# Patient Record
Sex: FEMALE | Race: ASIAN | Employment: STUDENT | URBAN - METROPOLITAN AREA
[De-identification: names, ages, dates, MRNs, and addresses within clinical notes are randomized per-mention and may not be internally consistent; named-entity substitution may affect disease eponyms.]

---
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
## 2019-07-16 ENCOUNTER — OFFICE VISIT (OUTPATIENT)
Dept: DERMATOLOGY | Facility: CLINIC | Age: 19
End: 2019-07-16
Payer: COMMERCIAL

## 2019-07-16 DIAGNOSIS — Z79.899 ON ISOTRETINOIN THERAPY: ICD-10-CM

## 2019-07-16 DIAGNOSIS — L70.0 ACNE VULGARIS: Primary | ICD-10-CM

## 2019-07-16 ASSESSMENT — PAIN SCALES - GENERAL: PAINLEVEL: NO PAIN (0)

## 2019-07-16 NOTE — LETTER
7/16/2019       RE: Jessy Portillo  [address] [address]     Dear Colleague,    Thank you for referring your patient, Jessy Portillo, to the [facility] DERMATOLOGY at [facility]. Please see a copy of my visit note below.    [facility] Dermatology Note      Dermatology Problem List:  1. Acne Vulgaris  -Current Tx: Will plan to start isotretinoin 40 mg in September   -Previous Tx: adapalene 0.3% gel, sulfamethoxazole-trimethoprim 180 mg BID, clindamycin,     Encounter Date: Jul 16, 2019    CC:  Chief Complaint   Patient presents with     Acne     Jessy is here today to be seen for acne.          History of Present Illness:  Ms. Jessy Portillo is a 19 year old female who is new to the clinic and presents for acne. At today's visit, the patient notes she has been dealing with intermittent acne for about six years. She states she had previously dealt with body acne but only deals with facial acne now. She says she is not currently using any prescription medication to treat her acne. Patient does not note a correlation between her menstrual cycle and acne. The patient denies painful, itching, tingling or bleeding lesions unless otherwise noted. Contraception: IUD, male latex condoms. No hx depression/anxiey. No hx IBD. Patient is leaving town during the month of August and will not be back until school starts in September.      Past Medical History:   Patient Active Problem List   Diagnosis     Acute appendicitis     History reviewed. No pertinent past medical history.  Past Surgical History:   Procedure Laterality Date     LAPAROSCOPIC APPENDECTOMY N/A 12/9/2016    Procedure: LAPAROSCOPIC APPENDECTOMY;  Surgeon: Perry Mckeon MD;  Location:  OR       Social History:   reports that she has never smoked. She has never used smokeless tobacco.    Family History:  History reviewed. No pertinent family  history.    Medications:  Current Outpatient Medications   Medication Sig Dispense Refill     dexamethasone (DECADRON) 4 MG tablet Take 1 tablet (4 mg) by mouth 2 times daily (with meals) for 3 days 6 tablet 0       No Known Allergies    Review of Systems:  -Constitutional: The patient denies fatigue, fevers, chills, unintended weight loss, and night sweats.  -HEENT: Patient denies nonhealing oral sores.  -Skin: As above in HPI. No additional skin concerns.  -Neuro: no HA or vision changes  -GI: No nausea, blood in stool, diarrhea, hx of IBD  -Psych: no depression/anxiety, mood changes, or sleep problems   -Musculoskeletal: no joint or muscle pain or swelling   -Heme/Lymph: no concerning bumps, no bleeding problems    Physical exam:  Vitals: There were no vitals taken for this visit.  GEN: This is a well developed, well-nourished female in no acute distress, in a pleasant mood.    SKIN: Acne exam, which includes the face, neck, upper and upper central back was performed.  -There are superifical acneiform papules with intermixed open and closed comedones on the face.   -No other lesions of concern on areas examined.       Impression/Plan:  1. Acne vulgaris    Discussed treatment options including topical ointments, oral antibiotics, and specifically Accutane and Spironolactone   Discussion of the risks and side effects of isotretinoin including but not limited to mucocutaneous dryness, arthralgias, myalgias, depression, suicidal ideation, headache, blurred vision, increase in liver function test and increase in lipids. The iPledge program brochure was provided and the contents discussed with the patient. The patient was counseled that they cannot give blood while on isotretinoin. Advised against tattoos and waxing. No personal or family history of inflammatory bowel disease or hypertriglyceridemia known to patient. Reviewed need to avoid alcohol on medication. The iPledge program consent was obtained. Patient  counseled that if they wear contacts, the eyes may become too dry to tolerate. Recommend follow up with eye doctor if this occurs.    Discussed need for sun protection, at least SPF 30+.  Urine pregnancy test obtained today will be deferred to 8/6/19 due to patient leaving town and unable to start within the 30 day window. We will start isotretinoin 40 mg daily pending negative pregnancy test on 08/06/19 due to patient leaving the country in mid August and returning in early September. One month supply with no refills will be provided. Goal dose is 150-220mg/kg for this (Obtain weight at next visit) patient.    Labs including CBC, BUN/Cr, fasting lipids and AST/ALT will be obtained next visit.   Contraception: IUD & male condoms  Total cumulative dose 0mg/kg.   Patient's iPledge # is [id number]  Will modify skin care routine at next visit, for now may continue with current regimen.      CC Dr. Bautista on close of this encounter.  Follow-up in 6-7 weeks, earlier for new or changing lesions.       Staff Involved:  Staff/Scribe    Scribe Disclosure:  I, Cornel Riddle, am serving as a scribe to document services personally performed by Italia Shipley PA-C, based on data collection and the provider's statements to me.     Provider Disclosure:   The documentation recorded by the scribe accurately reflects the services I personally performed and the decisions made by me.    All risks, benefits and alternatives were discussed with patient.  Patient is in agreement and understands the assessment and plan.  All questions were answered.    Italia Shipley PA-C  [facility]: Phone: [phone], Fax: [phone]

## 2019-07-16 NOTE — NURSING NOTE
Dermatology Rooming Note    Jessy Portillo's goals for this visit include:   Chief Complaint   Patient presents with     Acne     Jessy is here today to be seen for acne.      MAXIMINO Temple

## 2019-07-16 NOTE — PROGRESS NOTES
[facility] Dermatology Note      Dermatology Problem List:  1. Acne Vulgaris  -Current Tx: Will plan to start isotretinoin 40 mg in September   -Previous Tx: adapalene 0.3% gel, sulfamethoxazole-trimethoprim 180 mg BID, clindamycin,     Encounter Date: Jul 16, 2019    CC:  Chief Complaint   Patient presents with     Acne     Jessy is here today to be seen for acne.          History of Present Illness:  Ms. Jessy Portillo is a 19 year old female who is new to the clinic and presents for acne. At today's visit, the patient notes she has been dealing with intermittent acne for about six years. She states she had previously dealt with body acne but only deals with facial acne now. She says she is not currently using any prescription medication to treat her acne. Patient does not note a correlation between her menstrual cycle and acne. The patient denies painful, itching, tingling or bleeding lesions unless otherwise noted. Contraception: IUD, male latex condoms. No hx depression/anxiey. No hx IBD. Patient is leaving town during the month of August and will not be back until school starts in September.      Past Medical History:   Patient Active Problem List   Diagnosis     Acute appendicitis     History reviewed. No pertinent past medical history.  Past Surgical History:   Procedure Laterality Date     LAPAROSCOPIC APPENDECTOMY N/A 12/9/2016    Procedure: LAPAROSCOPIC APPENDECTOMY;  Surgeon: Perry Mckeon MD;  Location:  OR       Social History:   reports that she has never smoked. She has never used smokeless tobacco.    Family History:  History reviewed. No pertinent family history.    Medications:  Current Outpatient Medications   Medication Sig Dispense Refill     dexamethasone (DECADRON) 4 MG tablet Take 1 tablet (4 mg) by mouth 2 times daily (with meals) for 3 days 6 tablet 0       No Known Allergies    Review of Systems:  -Constitutional: The patient denies fatigue, fevers,  chills, unintended weight loss, and night sweats.  -HEENT: Patient denies nonhealing oral sores.  -Skin: As above in HPI. No additional skin concerns.  -Neuro: no HA or vision changes  -GI: No nausea, blood in stool, diarrhea, hx of IBD  -Psych: no depression/anxiety, mood changes, or sleep problems   -Musculoskeletal: no joint or muscle pain or swelling   -Heme/Lymph: no concerning bumps, no bleeding problems    Physical exam:  Vitals: There were no vitals taken for this visit.  GEN: This is a well developed, well-nourished female in no acute distress, in a pleasant mood.    SKIN: Acne exam, which includes the face, neck, upper and upper central back was performed.  -There are superifical acneiform papules with intermixed open and closed comedones on the face.   -No other lesions of concern on areas examined.       Impression/Plan:  1. Acne vulgaris    Discussed treatment options including topical ointments, oral antibiotics, and specifically Accutane and Spironolactone   Discussion of the risks and side effects of isotretinoin including but not limited to mucocutaneous dryness, arthralgias, myalgias, depression, suicidal ideation, headache, blurred vision, increase in liver function test and increase in lipids. The iPledge program brochure was provided and the contents discussed with the patient. The patient was counseled that they cannot give blood while on isotretinoin. Advised against tattoos and waxing. No personal or family history of inflammatory bowel disease or hypertriglyceridemia known to patient. Reviewed need to avoid alcohol on medication. The iPledge program consent was obtained. Patient counseled that if they wear contacts, the eyes may become too dry to tolerate. Recommend follow up with eye doctor if this occurs.    Discussed need for sun protection, at least SPF 30+.  Urine pregnancy test obtained today will be deferred to 8/6/19 due to patient leaving town and unable to start within the 30 day  window. We will start isotretinoin 40 mg daily pending negative pregnancy test on 08/06/19 due to patient leaving the country in mid August and returning in early September. One month supply with no refills will be provided. Goal dose is 150-220mg/kg for this (Obtain weight at next visit) patient.    Labs including CBC, BUN/Cr, fasting lipids and AST/ALT will be obtained next visit.   Contraception: IUD & male condoms  Total cumulative dose 0mg/kg.   Patient's iPledge # is [id number]  Will modify skin care routine at next visit, for now may continue with current regimen.      CC Dr. Bautista on close of this encounter.  Follow-up in 6-7 weeks, earlier for new or changing lesions.       Staff Involved:  Staff/Scribe    Scribe Disclosure:  I, Cornel Riddle, am serving as a scribe to document services personally performed by Italia Shipley PA-C, based on data collection and the provider's statements to me.     Provider Disclosure:   The documentation recorded by the scribe accurately reflects the services I personally performed and the decisions made by me.    All risks, benefits and alternatives were discussed with patient.  Patient is in agreement and understands the assessment and plan.  All questions were answered.    Italia Shipley PA-C  [facility]: Phone: [phone], Fax: [phone]

## 2019-08-05 ENCOUNTER — APPOINTMENT (OUTPATIENT)
Dept: LAB | Facility: CLINIC | Age: 19
End: 2019-08-05
Payer: COMMERCIAL

## 2019-08-09 ENCOUNTER — TELEPHONE (OUTPATIENT)
Dept: DERMATOLOGY | Facility: CLINIC | Age: 19
End: 2019-08-09

## 2019-08-09 DIAGNOSIS — L70.0 ACNE VULGARIS: ICD-10-CM

## 2019-08-09 DIAGNOSIS — Z79.899 ON ISOTRETINOIN THERAPY: Primary | ICD-10-CM

## 2019-08-09 NOTE — TELEPHONE ENCOUNTER
[facility] Call Center    Phone Message    May a detailed message be left on voicemail: yes    Reason for Call: Other: Pt calling in and stated that she did leave a sample for her pregnancy test, but they told her they would stillneed an order. Pt is requesting a call back to discuss this. Please follow up when available. Thank you     Action Taken: Message routed to:  [facility] ([facility] Derm

## 2019-08-09 NOTE — TELEPHONE ENCOUNTER
Called pt and LVM. Wanted to let her know that she had not got her pregnancy test done on 8/6/19. She needs to get that done soon, and we need to reschedule her 9/6/19 appointment. Clinic number provided.   MAXIMINO Temple

## 2019-08-09 NOTE — TELEPHONE ENCOUNTER
Called pt back and said that she came in on 8/6/19 and had blood drawn and was told that there was no order in, but said that her blood would be kept for a week. When I called lab they said that she was actually in on the 5th and they said that the blood had already been thrown away. I called pt back and let her know that she needs to come back in sometime that is convenient for her. Pt is coming in 8/12/19 for a pregnancy test.     MAXIMINO Temple

## 2019-08-12 DIAGNOSIS — Z79.899 ON ISOTRETINOIN THERAPY: ICD-10-CM

## 2019-08-12 LAB — HCG UR QL: NEGATIVE

## 2019-09-10 ENCOUNTER — OFFICE VISIT (OUTPATIENT)
Dept: DERMATOLOGY | Facility: CLINIC | Age: 19
End: 2019-09-10
Payer: COMMERCIAL

## 2019-09-10 DIAGNOSIS — Z79.899 ON ISOTRETINOIN THERAPY: ICD-10-CM

## 2019-09-10 DIAGNOSIS — L70.0 ACNE VULGARIS: Primary | ICD-10-CM

## 2019-09-10 LAB
ALBUMIN SERPL-MCNC: 4.2 G/DL (ref 3.4–5)
ALP SERPL-CCNC: 58 U/L (ref 40–150)
ALT SERPL W P-5'-P-CCNC: 20 U/L (ref 0–50)
ANION GAP SERPL CALCULATED.3IONS-SCNC: 6 MMOL/L (ref 3–14)
AST SERPL W P-5'-P-CCNC: 18 U/L (ref 0–45)
BASOPHILS # BLD AUTO: 0 10E9/L (ref 0–0.2)
BASOPHILS NFR BLD AUTO: 0.7 %
BILIRUB SERPL-MCNC: 0.7 MG/DL (ref 0.2–1.3)
BUN SERPL-MCNC: 7 MG/DL (ref 7–30)
CALCIUM SERPL-MCNC: 9.2 MG/DL (ref 8.5–10.1)
CHLORIDE SERPL-SCNC: 102 MMOL/L (ref 94–109)
CHOLEST SERPL-MCNC: 217 MG/DL
CO2 SERPL-SCNC: 27 MMOL/L (ref 20–32)
CREAT SERPL-MCNC: 0.76 MG/DL (ref 0.52–1.04)
DIFFERENTIAL METHOD BLD: NORMAL
EOSINOPHIL # BLD AUTO: 0.1 10E9/L (ref 0–0.7)
EOSINOPHIL NFR BLD AUTO: 1.9 %
ERYTHROCYTE [DISTWIDTH] IN BLOOD BY AUTOMATED COUNT: 14.1 % (ref 10–15)
GFR SERPL CREATININE-BSD FRML MDRD: >90 ML/MIN/{1.73_M2}
GLUCOSE SERPL-MCNC: 91 MG/DL (ref 70–99)
HCT VFR BLD AUTO: 42.1 % (ref 35–47)
HDLC SERPL-MCNC: 86 MG/DL
HGB BLD-MCNC: 13.3 G/DL (ref 11.7–15.7)
IMM GRANULOCYTES # BLD: 0 10E9/L (ref 0–0.4)
IMM GRANULOCYTES NFR BLD: 0 %
LDLC SERPL CALC-MCNC: 120 MG/DL
LYMPHOCYTES # BLD AUTO: 1.7 10E9/L (ref 0.8–5.3)
LYMPHOCYTES NFR BLD AUTO: 40.4 %
MCH RBC QN AUTO: 27.3 PG (ref 26.5–33)
MCHC RBC AUTO-ENTMCNC: 31.6 G/DL (ref 31.5–36.5)
MCV RBC AUTO: 86 FL (ref 78–100)
MONOCYTES # BLD AUTO: 0.5 10E9/L (ref 0–1.3)
MONOCYTES NFR BLD AUTO: 11.5 %
NEUTROPHILS # BLD AUTO: 1.9 10E9/L (ref 1.6–8.3)
NEUTROPHILS NFR BLD AUTO: 45.5 %
NONHDLC SERPL-MCNC: 131 MG/DL
NRBC # BLD AUTO: 0 10*3/UL
NRBC BLD AUTO-RTO: 0 /100
PLATELET # BLD AUTO: 267 10E9/L (ref 150–450)
POTASSIUM SERPL-SCNC: 3.5 MMOL/L (ref 3.4–5.3)
PROT SERPL-MCNC: 8.4 G/DL (ref 6.8–8.8)
RBC # BLD AUTO: 4.87 10E12/L (ref 3.8–5.2)
SODIUM SERPL-SCNC: 135 MMOL/L (ref 133–144)
TRIGL SERPL-MCNC: 53 MG/DL
WBC # BLD AUTO: 4.2 10E9/L (ref 4–11)

## 2019-09-10 RX ORDER — ISOTRETINOIN 40 MG/1
40 CAPSULE ORAL DAILY
Qty: 30 CAPSULE | Refills: 0 | Status: SHIPPED | OUTPATIENT
Start: 2019-09-10 | End: 2019-10-17

## 2019-09-10 ASSESSMENT — PAIN SCALES - GENERAL: PAINLEVEL: NO PAIN (0)

## 2019-09-10 NOTE — NURSING NOTE
Dermatology Rooming Note    Jessy Portillo's goals for this visit include:   Chief Complaint   Patient presents with     Accutane     Jessy is here today to start accutane.      MAXIMINO Temple

## 2019-09-10 NOTE — PROGRESS NOTES
[facility] Dermatology Note      Dermatology Problem List:  1. Acne Vulgaris  -Current Tx: Start isotretinoin 40mg every day - started 9/10/19  -Previous Tx: adapalene 0.3% gel, sulfamethoxazole-trimethoprim 180 mg BID, clindamycin,     Encounter Date: Sep 10, 2019    CC:  Chief Complaint   Patient presents with     Accutane     Jessy is here today to start accutane.          History of Present Illness:  Ms. Jessy Portillo is a 19 year old female who presents as a follow-up for acne. The patient was last seen on 07/16/19 when isotretinoin 40mg every day was planned to be started for acne in September as the patient left the country in mid August and will be returning in early September.     At today's visit, the patient does not note any specific concerns with starting Accutane and is excited to start the medication. Contraception includes IUD and male latex condoms. Is also currently abstinent. She is not taking any other medications at this time. Her acne has been recalcitrant to oral abx and topical adapalene and clindamycin.       Past Medical History:   Patient Active Problem List   Diagnosis     Acute appendicitis     No past medical history on file.  Past Surgical History:   Procedure Laterality Date     LAPAROSCOPIC APPENDECTOMY N/A 12/9/2016    Procedure: LAPAROSCOPIC APPENDECTOMY;  Surgeon: Perry Mckeon MD;  Location:  OR       Social History:   reports that she has never smoked. She has never used smokeless tobacco.    Family History:  No family history on file.    Medications:  Current Outpatient Medications   Medication Sig Dispense Refill     dexamethasone (DECADRON) 4 MG tablet Take 1 tablet (4 mg) by mouth 2 times daily (with meals) for 3 days 6 tablet 0       No Known Allergies    Review of Systems:  -Neuro: no HA or vision changes  -GI: No nausea, blood in stool, diarrhea, hx of IBD  -Psych: no depression/anxiety, mood changes, or sleep problems    -Musculoskeletal: no joint or muscle pain or swelling   -Heme/Lymph: no concerning bumps, no bleeding problems  -Constitutional: The patient denies fatigue, fevers, chills, unintended weight loss, and night sweats.  -HEENT: Patient denies nonhealing oral sores.  -Skin: As above in HPI. No additional skin concerns.    Physical exam:  Vitals: There were no vitals taken for this visit.  GEN: This is a well developed, well-nourished female in no acute distress, in a pleasant mood.    SKIN: Acne exam, which includes the face, neck, upper central chest, and upper central back was performed.  -There are superifical acneiform papules with intermixed open and closed comedones on the face.   -No other lesions of concern on areas examined.       Impression/Plan:  1. Acne vulgaris  Will start isotretinoin 40mg this month pending negative pregnancy test. Goal dose is 150-220mg/kg for this 66.82kg patient.   Method of contraception includes: IUD and male condoms. Patient is also currently abstinent.  Discussion of the risks and side effects of isotretinoin including but not limited to mucocutaneous dryness, arthralgias, myalgias, depression, suicidal ideation, headache, blurred vision, increase in liver function test and increase in lipids. The iPledge program brochure was provided and the contents discussed with the patient. The patient was counseled that they cannot give blood while on isotretinoin. Advised against tattoos and waxing. No personal or family history of inflammatory bowel disease or hypertriglyceridemia known to patient. Reviewed need to avoid alcohol on medication. The iPledge program consent was obtained. Patient counseled that if they wear contacts, the eyes may become too dry to tolerate. Recommend follow up with eye doctor if this occurs.    Discussed need for sun protection, at least SPF 30+.  Urine pregnancy test obtained on 9/12/2019.  Baseline labs including qualitative hCG, CBC, BUN/Cr, lipids and  AST/ALT will be obtained.   Patient's iPledge # is [id number].   The patient will stop all other acne medications this month.  Total dose: 0mg/kg    CC Dr. Stafford on close of this encounter.  Follow-up in 1 month, earlier for new or changing lesions.       Staff Involved:  Staff/Scribe    Scribe Disclosure:  I, Cornel Riddle, am serving as a scribe to document services personally performed by Italia Shipley PA-C, based on data collection and the provider's statements to me.     Provider Disclosure:   The documentation recorded by the scribe accurately reflects the services I personally performed and the decisions made by me.    All risks, benefits and alternatives were discussed with patient.  Patient is in agreement and understands the assessment and plan.  All questions were answered.    Italia Shipley PA-C  [facility]: Phone: [phone], Fax: [phone]

## 2019-09-10 NOTE — LETTER
9/10/2019       RE: Jessy Portillo  [address] [address]     Dear Colleague,    Thank you for referring your patient, Jessy Portillo, to the [facility] DERMATOLOGY at [facility]. Please see a copy of my visit note below.    [facility] Dermatology Note      Dermatology Problem List:  1. Acne Vulgaris  -Current Tx: Start isotretinoin 40mg every day - started 9/10/19  -Previous Tx: adapalene 0.3% gel, sulfamethoxazole-trimethoprim 180 mg BID, clindamycin,     Encounter Date: Sep 10, 2019    CC:  Chief Complaint   Patient presents with     Accutane     Jessy is here today to start accutane.          History of Present Illness:  Ms. Jessy Portillo is a 19 year old female who presents as a follow-up for acne. The patient was last seen on 07/16/19 when isotretinoin 40mg every day was planned to be started for acne in September as the patient left the country in mid August and will be returning in early September.     At today's visit, the patient does not note any specific concerns with starting Accutane and is excited to start the medication. Contraception includes IUD and male latex condoms. Is also currently abstinent. She is not taking any other medications at this time. Her acne has been recalcitrant to oral abx and topical adapalene and clindamycin.       Past Medical History:   Patient Active Problem List   Diagnosis     Acute appendicitis     No past medical history on file.  Past Surgical History:   Procedure Laterality Date     LAPAROSCOPIC APPENDECTOMY N/A 12/9/2016    Procedure: LAPAROSCOPIC APPENDECTOMY;  Surgeon: Perry Mckeon MD;  Location:  OR       Social History:   reports that she has never smoked. She has never used smokeless tobacco.    Family History:  No family history on file.    Medications:  Current Outpatient Medications   Medication Sig Dispense Refill     dexamethasone (DECADRON) 4 MG  tablet Take 1 tablet (4 mg) by mouth 2 times daily (with meals) for 3 days 6 tablet 0       No Known Allergies    Review of Systems:  -Neuro: no HA or vision changes  -GI: No nausea, blood in stool, diarrhea, hx of IBD  -Psych: no depression/anxiety, mood changes, or sleep problems   -Musculoskeletal: no joint or muscle pain or swelling   -Heme/Lymph: no concerning bumps, no bleeding problems  -Constitutional: The patient denies fatigue, fevers, chills, unintended weight loss, and night sweats.  -HEENT: Patient denies nonhealing oral sores.  -Skin: As above in HPI. No additional skin concerns.    Physical exam:  Vitals: There were no vitals taken for this visit.  GEN: This is a well developed, well-nourished female in no acute distress, in a pleasant mood.    SKIN: Acne exam, which includes the face, neck, upper central chest, and upper central back was performed.  -There are superifical acneiform papules with intermixed open and closed comedones on the face.   -No other lesions of concern on areas examined.       Impression/Plan:  1. Acne vulgaris  Will start isotretinoin 40mg this month pending negative pregnancy test. Goal dose is 150-220mg/kg for this 66.82kg patient.   Method of contraception includes: IUD and male condoms. Patient is also currently abstinent.  Discussion of the risks and side effects of isotretinoin including but not limited to mucocutaneous dryness, arthralgias, myalgias, depression, suicidal ideation, headache, blurred vision, increase in liver function test and increase in lipids. The iPledge program brochure was provided and the contents discussed with the patient. The patient was counseled that they cannot give blood while on isotretinoin. Advised against tattoos and waxing. No personal or family history of inflammatory bowel disease or hypertriglyceridemia known to patient. Reviewed need to avoid alcohol on medication. The iPledge program consent was obtained. Patient counseled that if  they wear contacts, the eyes may become too dry to tolerate. Recommend follow up with eye doctor if this occurs.    Discussed need for sun protection, at least SPF 30+.  Urine pregnancy test obtained on 9/12/2019.  Baseline labs including qualitative hCG, CBC, BUN/Cr, lipids and AST/ALT will be obtained.   Patient's iPledge # is [id number].   The patient will stop all other acne medications this month.  Total dose: 0mg/kg    CC Dr. Stafford on close of this encounter.  Follow-up in 1 month, earlier for new or changing lesions.       Staff Involved:  Staff/Scribe    Scribe Disclosure:  I, Cornel Riddle, am serving as a scribe to document services personally performed by Italia Shipley PA-C, based on data collection and the provider's statements to me.     Provider Disclosure:   The documentation recorded by the scribe accurately reflects the services I personally performed and the decisions made by me.    All risks, benefits and alternatives were discussed with patient.  Patient is in agreement and understands the assessment and plan.  All questions were answered.    Italia Shipley PA-C  [facility]: Phone: [phone], Fax: [phone]

## 2019-09-12 ENCOUNTER — TELEPHONE (OUTPATIENT)
Dept: DERMATOLOGY | Facility: CLINIC | Age: 19
End: 2019-09-12

## 2019-09-12 DIAGNOSIS — Z79.899 ON ISOTRETINOIN THERAPY: ICD-10-CM

## 2019-09-12 LAB — HCG UR QL: NEGATIVE

## 2019-09-12 NOTE — TELEPHONE ENCOUNTER
Patient called stating she is unable to answer the questions in Ipledge. I let her know that once we have the results of pregnancy test we will confirm her in ipledge. Once that is completed she can answer the questions.    KI ChenA

## 2019-09-27 ENCOUNTER — TELEPHONE (OUTPATIENT)
Dept: DERMATOLOGY | Facility: CLINIC | Age: 19
End: 2019-09-27

## 2019-10-03 ENCOUNTER — TELEPHONE (OUTPATIENT)
Dept: DERMATOLOGY | Facility: CLINIC | Age: 19
End: 2019-10-03

## 2019-10-03 NOTE — TELEPHONE ENCOUNTER
"[facility] Call Center    Phone Message    May a detailed message be left on voicemail: yes    Reason for Call: Other: .  MUST be scheduled with the same provider they have seen in the past, and between 30-36 days of their last visit. Patient needs labs scheduled prior to their provider visit. If no appointment available between 30-36 days, send a telephone encounter to the main clinic pool.     Unsure what \"main\" clinic pool is - this is scheduling related - so will send to coordinator pool    Checked Quinten schedule and out past time limit    Action Taken: Message routed to:  [facility] derm  "

## 2019-10-17 ENCOUNTER — OFFICE VISIT (OUTPATIENT)
Dept: DERMATOLOGY | Facility: CLINIC | Age: 19
End: 2019-10-17
Payer: COMMERCIAL

## 2019-10-17 DIAGNOSIS — Z79.899 ON ISOTRETINOIN THERAPY: ICD-10-CM

## 2019-10-17 DIAGNOSIS — Z51.81 MEDICATION MONITORING ENCOUNTER: ICD-10-CM

## 2019-10-17 DIAGNOSIS — L70.0 ACNE VULGARIS: Primary | ICD-10-CM

## 2019-10-17 LAB — HCG UR QL: NEGATIVE

## 2019-10-17 RX ORDER — ISOTRETINOIN 25 MG/1
50 CAPSULE ORAL DAILY
Qty: 30 CAPSULE | Refills: 0 | Status: SHIPPED | OUTPATIENT
Start: 2019-10-17 | End: 2019-12-20 | Stop reason: DRUGHIGH

## 2019-10-17 ASSESSMENT — PAIN SCALES - GENERAL: PAINLEVEL: NO PAIN (0)

## 2019-10-17 NOTE — LETTER
"10/17/2019       RE: Jessy Portillo  [address]  [facility] [address]     Dear Colleague,    Thank you for referring your patient, Jessy Portillo, to the [facility] DERMATOLOGY at [facility]. Please see a copy of my visit note below.    Chief Complaint   Patient presents with     Accutane     Accutane follow up     Ayala Lange CMA      [facility] Dermatology Note    Dermatology Clinic  [facility]  Clinics and Surgery Center  [address] [place] [address]    Dermatology Problem List:  1. Acne Vulgaris  -Current Tx: Isotretinoin started 9/10/19 (40-50 if able to get 25 mg capsules otherwise 60)  -Previous Tx: adapalene 0.3% gel, sulfamethoxazole-trimethoprim 180 mg BID, clindamycin    Encounter Date: Oct 17, 2019    CC:  Chief Complaint   Patient presents with     Accutane     Accutane follow up       History of Present Illness:  Ms. Jessy Portillo is a 19 year old female who presents as a follow-up for acne. The patient was last seen by Italia Shipley PA-C on 9/10/19 for acne vulgaris. At the time she was started on Accutane 40 mg daily. Today the patient reports that her first month has went well. Her skin is dry but is tolerable. She has had a mild headache which she treats with Advil. Reports that she did go to the eye doctor because her eyesight is getting worse and she needed her prescription updated, but she is unsure if it is due to the Accutane. Has had some cramping of the lower extremities. No stomach upset, no depression but feels \"burnt out\". She wonders if some of her recent symptoms are related to her recent IUD placement.     She feels that it is too early to tell if the Accutane is helping. She is otherwise in her usual state of health, no additional skin concerns. She is going out of town this weekend and has a midterm next week.    Past Medical History:   Patient Active Problem List "   Diagnosis     Acute appendicitis     History reviewed. No pertinent past medical history.  Past Surgical History:   Procedure Laterality Date     LAPAROSCOPIC APPENDECTOMY N/A 12/9/2016    Procedure: LAPAROSCOPIC APPENDECTOMY;  Surgeon: Perry Mckeon MD;  Location:  OR       Social History:  Patient reports that she has never smoked. She has never used smokeless tobacco.   She is a student.    Family History:  History reviewed. No pertinent family history.   Her sister also had acne per chart review.    Medications:  Current Outpatient Medications   Medication Sig Dispense Refill     ISOtretinoin (ACCUTANE) 40 MG capsule Take 1 capsule (40 mg) by mouth daily 30 capsule 0       No Known Allergies    Review of Systems:  -As per HPI  -Constitutional: Otherwise feeling well today, in usual state of health.  -Skin: As above in HPI. No additional skin concerns.  - Accutane: +muscle cramping, mild headache, change in glasses prescription, apathy. No depression or suicidal ideation.    Physical exam:  GEN: This is a well developed, well-nourished female in no acute distress, in a pleasant mood.    SKIN: Focused examination of the face and neck was performed.  -Kearns skin type: II  -Many acneiform papules predominantly on the cheeks, less on the chin and forehead, and rare pustules  -No other lesions of concern on areas examined.     Impression/Plan:  1. Acne vulgaris, s/p month #1 of isotretinoin. Overall stable with some mild symptoms but patient not sure if related to isotretinoin or recent IUD. Discussed increasing dose versus maintaining and she would like to increase dose. If able to get 25 mg tabs, will increase to 50 mg daily, otherwise will increase to 60 mg daily. She has a negative pregnancy test today but does not have time to get blood work today. She will return for blood work next Wednesday after her midterm.    Increase isotretinoin to 50 mg daily if able to get 25 mg tabs, otherwise increase to  60 mg daily. Goal dose is 120-150mg/kg (8,016-10,020 mg) for this 66.82kg patient.     Method of contraception includes: IUD and male condoms. Patient is also currently abstinent.     Discussed need for sun protection, at least SPF 30+.    Pregnancy test negative 10/17/19.    Will return on Wednesday for repeat CBC, hepatic panel, and lipids.    Patient's iPledge # is [id number].     Recommended Vanicream, Cetaphil gentle facial cleanser, Vaseline for lips or nose, or gentle moisturizer, etc, to help with skin dryness.     Total dose: 1200 mg    Follow-up in 1 month, sooner if concerns.  Return for labs on Wednesday.    Staff Involved:    Scribe Disclosure  I, Conchsi Pandya, am serving as a scribe to document services personally performed by Dr. Becky Lemus, based on data collection and the provider's statements to me.     Provider Disclosure:   The documentation recorded by the scribe accurately reflects the services I personally performed and the decisions made by me.    Becky Lemus MD    Department of Dermatology  [facility]: Phone: [phone], Fax: [phone]

## 2019-10-17 NOTE — PROGRESS NOTES
"[facility] Dermatology Note    Dermatology Clinic  [facility]  [address] [place] [id number]    Dermatology Problem List:  1. Acne Vulgaris  -Current Tx: Isotretinoin started 9/10/19 (40-50 if able to get 25 mg capsules otherwise 60)  -Previous Tx: adapalene 0.3% gel, sulfamethoxazole-trimethoprim 180 mg BID, clindamycin    Encounter Date: Oct 17, 2019    CC:  Chief Complaint   Patient presents with     Accutane     Accutane follow up       History of Present Illness:  Ms. Jessy Portillo is a 19 year old female who presents as a follow-up for acne. The patient was last seen by Italia Shipley PA-C on 9/10/19 for acne vulgaris. At the time she was started on Accutane 40 mg daily. Today the patient reports that her first month has went well. Her skin is dry but is tolerable. She has had a mild headache which she treats with Advil. Reports that she did go to the eye doctor because her eyesight is getting worse and she needed her prescription updated, but she is unsure if it is due to the Accutane. Has had some cramping of the lower extremities. No stomach upset, no depression but feels \"burnt out\". She wonders if some of her recent symptoms are related to her recent IUD placement.     She feels that it is too early to tell if the Accutane is helping. She is otherwise in her usual state of health, no additional skin concerns. She is going out of town this weekend and has a midterm next week.    Past Medical History:   Patient Active Problem List   Diagnosis     Acute appendicitis     History reviewed. No pertinent past medical history.  Past Surgical History:   Procedure Laterality Date     LAPAROSCOPIC APPENDECTOMY N/A 12/9/2016    Procedure: LAPAROSCOPIC APPENDECTOMY;  Surgeon: Perry Mckeon MD;  Location:  OR       Social History:  Patient reports that she has never smoked. She has never used smokeless tobacco.   She is a " student.    Family History:  History reviewed. No pertinent family history.   Her sister also had acne per chart review.    Medications:  Current Outpatient Medications   Medication Sig Dispense Refill     ISOtretinoin (ACCUTANE) 40 MG capsule Take 1 capsule (40 mg) by mouth daily 30 capsule 0       No Known Allergies    Review of Systems:  -As per HPI  -Constitutional: Otherwise feeling well today, in usual state of health.  -Skin: As above in HPI. No additional skin concerns.  - Accutane: +muscle cramping, mild headache, change in glasses prescription, apathy. No depression or suicidal ideation.    Physical exam:  GEN: This is a well developed, well-nourished female in no acute distress, in a pleasant mood.    SKIN: Focused examination of the face and neck was performed.  -Kearns skin type: II  -Many acneiform papules predominantly on the cheeks, less on the chin and forehead, and rare pustules  -No other lesions of concern on areas examined.     Impression/Plan:  1. Acne vulgaris, s/p month #1 of isotretinoin. Overall stable with some mild symptoms but patient not sure if related to isotretinoin or recent IUD. Discussed increasing dose versus maintaining and she would like to increase dose. If able to get 25 mg tabs, will increase to 50 mg daily, otherwise will increase to 60 mg daily. She has a negative pregnancy test today but does not have time to get blood work today. She will return for blood work next Wednesday after her midterm.    Increase isotretinoin to 50 mg daily if able to get 25 mg tabs, otherwise increase to 60 mg daily. Goal dose is 120-150mg/kg (8,016-10,020 mg) for this 66.82kg patient.     Method of contraception includes: IUD and male condoms. Patient is also currently abstinent.     Discussed need for sun protection, at least SPF 30+.    Pregnancy test negative 10/17/19.    Will return on Wednesday for repeat CBC, hepatic panel, and lipids.    Patient's iPledge # is [id number].      Recommended Vanicream, Cetaphil gentle facial cleanser, Vaseline for lips or nose, or gentle moisturizer, etc, to help with skin dryness.     Total dose: 1200 mg    Follow-up in 1 month, sooner if concerns.  Return for labs on Wednesday.    Staff Involved:    Scribe Disclosure  I, Conchis Mumtaz, am serving as a scribe to document services personally performed by Dr. Becky Lemus, based on data collection and the provider's statements to me.     Provider Disclosure:   The documentation recorded by the scribe accurately reflects the services I personally performed and the decisions made by me.    Becky Lemus MD    Department of Dermatology  [facility]: Phone: [phone], Fax: [phone]

## 2019-10-18 ENCOUNTER — TELEPHONE (OUTPATIENT)
Dept: DERMATOLOGY | Facility: CLINIC | Age: 19
End: 2019-10-18

## 2019-10-18 DIAGNOSIS — L70.0 ACNE VULGARIS: ICD-10-CM

## 2019-10-18 DIAGNOSIS — L70.0 ACNE VULGARIS: Primary | ICD-10-CM

## 2019-10-18 RX ORDER — ISOTRETINOIN 10 MG/1
10 CAPSULE ORAL DAILY
Qty: 30 CAPSULE | Refills: 0 | Status: SHIPPED | OUTPATIENT
Start: 2019-10-18 | End: 2019-12-20 | Stop reason: DRUGHIGH

## 2019-10-18 RX ORDER — ISOTRETINOIN 40 MG/1
40 CAPSULE ORAL DAILY
Qty: 30 CAPSULE | Refills: 0 | Status: SHIPPED | OUTPATIENT
Start: 2019-10-18 | End: 2019-12-20 | Stop reason: DRUGHIGH

## 2019-10-18 RX ORDER — ISOTRETINOIN 10 MG/1
10 CAPSULE ORAL DAILY
Qty: 30 CAPSULE | Refills: 0 | Status: SHIPPED | OUTPATIENT
Start: 2019-10-18 | End: 2019-10-18

## 2019-10-18 RX ORDER — ISOTRETINOIN 40 MG/1
40 CAPSULE ORAL DAILY
Qty: 30 CAPSULE | Refills: 0 | Status: SHIPPED | OUTPATIENT
Start: 2019-10-18 | End: 2019-10-18

## 2019-10-18 NOTE — TELEPHONE ENCOUNTER
[facility] Call Center    Phone Message    May a detailed message be left on voicemail: yes    Reason for Call: Medication Question or concern regarding medication   Prescription Clarification  Name of Medication: ISOtretinoin 25 MG CAPS     Prescribing Provider: Allan   Pharmacy:[facility] [id number] [place] [address]   What on the order needs clarification? Pt insurance cost for how Rx is written is too costly. Requesting 2 separate Rx. One for 40mg and one for 10mg to equal 50mg at 30 day supply each          Action Taken: Message routed to:  [facility] ([facility]): derm

## 2019-10-19 NOTE — TELEPHONE ENCOUNTER
Received medication refill request for isotretinoin 50 mg daily. Refill request approved. The 25 mg pills were too expensive and will substitute for 40 mg plus 10 mg pills per pharmacy request.

## 2019-11-19 ENCOUNTER — TELEPHONE (OUTPATIENT)
Dept: DERMATOLOGY | Facility: CLINIC | Age: 19
End: 2019-11-19

## 2019-11-19 NOTE — TELEPHONE ENCOUNTER
[facility] Call Center    Phone Message    May a detailed message be left on voicemail: yes    Reason for Call: Other: Jessy calling to reschedule her missed accutane appointment that was scheduled for today at 7am.  She has [employer] so cannot see Dr. Lemus for this.  Italia Shipley does not have any availability this week.  Please call Jessy to discuss/schedule     Action Taken: Message routed to:  [facility] ([facility]): [facility]

## 2019-11-21 ENCOUNTER — OFFICE VISIT (OUTPATIENT)
Dept: DERMATOLOGY | Facility: CLINIC | Age: 19
End: 2019-11-21
Payer: COMMERCIAL

## 2019-11-21 DIAGNOSIS — Z51.81 THERAPEUTIC DRUG MONITORING: ICD-10-CM

## 2019-11-21 DIAGNOSIS — Z79.899 ON ISOTRETINOIN THERAPY: ICD-10-CM

## 2019-11-21 DIAGNOSIS — L70.0 ACNE VULGARIS: Primary | ICD-10-CM

## 2019-11-21 DIAGNOSIS — L70.0 ACNE VULGARIS: ICD-10-CM

## 2019-11-21 LAB
ALBUMIN SERPL-MCNC: 3.8 G/DL (ref 3.4–5)
ALP SERPL-CCNC: 62 U/L (ref 40–150)
ALT SERPL W P-5'-P-CCNC: 19 U/L (ref 0–50)
AST SERPL W P-5'-P-CCNC: 20 U/L (ref 0–45)
BASOPHILS # BLD AUTO: 0 10E9/L (ref 0–0.2)
BASOPHILS NFR BLD AUTO: 0.4 %
BILIRUB DIRECT SERPL-MCNC: 0.1 MG/DL (ref 0–0.2)
BILIRUB SERPL-MCNC: 0.4 MG/DL (ref 0.2–1.3)
CHOLEST SERPL-MCNC: 184 MG/DL
DIFFERENTIAL METHOD BLD: ABNORMAL
EOSINOPHIL # BLD AUTO: 0.1 10E9/L (ref 0–0.7)
EOSINOPHIL NFR BLD AUTO: 2.7 %
ERYTHROCYTE [DISTWIDTH] IN BLOOD BY AUTOMATED COUNT: 13.2 % (ref 10–15)
HCG UR QL: NEGATIVE
HCT VFR BLD AUTO: 41.6 % (ref 35–47)
HDLC SERPL-MCNC: 62 MG/DL
HGB BLD-MCNC: 13 G/DL (ref 11.7–15.7)
IMM GRANULOCYTES # BLD: 0 10E9/L (ref 0–0.4)
IMM GRANULOCYTES NFR BLD: 0 %
LDLC SERPL CALC-MCNC: 109 MG/DL
LYMPHOCYTES # BLD AUTO: 2 10E9/L (ref 0.8–5.3)
LYMPHOCYTES NFR BLD AUTO: 37.9 %
MCH RBC QN AUTO: 26.7 PG (ref 26.5–33)
MCHC RBC AUTO-ENTMCNC: 31.3 G/DL (ref 31.5–36.5)
MCV RBC AUTO: 86 FL (ref 78–100)
MONOCYTES # BLD AUTO: 0.4 10E9/L (ref 0–1.3)
MONOCYTES NFR BLD AUTO: 8.5 %
NEUTROPHILS # BLD AUTO: 2.6 10E9/L (ref 1.6–8.3)
NEUTROPHILS NFR BLD AUTO: 50.5 %
NONHDLC SERPL-MCNC: 122 MG/DL
NRBC # BLD AUTO: 0 10*3/UL
NRBC BLD AUTO-RTO: 0 /100
PLATELET # BLD AUTO: 287 10E9/L (ref 150–450)
PROT SERPL-MCNC: 7.8 G/DL (ref 6.8–8.8)
RBC # BLD AUTO: 4.86 10E12/L (ref 3.8–5.2)
TRIGL SERPL-MCNC: 64 MG/DL
WBC # BLD AUTO: 5.2 10E9/L (ref 4–11)

## 2019-11-21 RX ORDER — ISOTRETINOIN 30 MG/1
60 CAPSULE ORAL DAILY
Qty: 60 CAPSULE | Refills: 0 | Status: SHIPPED | OUTPATIENT
Start: 2019-11-21 | End: 2019-12-20

## 2019-11-21 ASSESSMENT — PAIN SCALES - GENERAL: PAINLEVEL: NO PAIN (0)

## 2019-11-21 NOTE — LETTER
11/21/2019       RE: Jess[facility] Portillo  [address] [facility] [address]     Dear Colleague,    Thank you for referring your patient, Jessy Portillo, to the [facility] DERMATOLOGY at [facility]. Please see a copy of my visit note below.    [facility] Dermatology Note    Dermatology Clinic  [facility]  [address] [place] [address]    Dermatology Problem List:  1. Acne Vulgaris  -Current Tx: Isotretinoin started 9/10/19, increased to 60 mg PO qdaily on 11/21/19  -Previous Tx: adapalene 0.3% gel, sulfamethoxazole-trimethoprim 180 mg BID, clindamycin    Encounter Date: Nov 21, 2019    CC:  Chief Complaint   Patient presents with     Derm Problem     Jessy is here for a accutane follow up, states her acne is getting worse. Experiencing dryness and headaches.        History of Present Illness:  Ms. Jessy Portillo is a 19 year old female who presents as a follow-up for acne. She feels as though her acne is slightly worse than prior, particularly on the face. She is not using any topical therapies. She states she has been tolerating isotretinoin well. Mild dry skin and dry lips. No muscle aches, arthralgias or headaches. She has noted some vision changes; she wears glasses and has noticed dry eyes. No mood changes or suicidal ideation; though patient is concerned given she has had history of depression and mood issues in the past. She has a IUD and using condoms for contraception. Health otherwise stable. No other skin concerns.       Past Medical History:   Patient Active Problem List   Diagnosis     Acute appendicitis     No past medical history on file.  Past Surgical History:   Procedure Laterality Date     LAPAROSCOPIC APPENDECTOMY N/A 12/9/2016    Procedure: LAPAROSCOPIC APPENDECTOMY;  Surgeon: Perry Mckeon MD;  Location:  OR       Social History:  Patient reports that  she has never smoked. She has never used smokeless tobacco.   She is a student.    Family History:  No family history on file.   Her sister also had acne per chart review.    Medications:  Current Outpatient Medications   Medication Sig Dispense Refill     ISOtretinoin (ACCUTANE) 10 MG capsule Take 1 capsule (10 mg) by mouth daily 30 capsule 0     ISOtretinoin (ACCUTANE) 40 MG capsule Take 1 capsule (40 mg) by mouth daily 30 capsule 0     ISOtretinoin 25 MG CAPS Take 50 mg by mouth daily 30 capsule 0       No Known Allergies    Review of Systems:  -As per HPI  -Constitutional: Otherwise feeling well today, in usual state of health.  -Skin: As above in HPI. No additional skin concerns.  - Accutane: +muscle cramping, mild headache, change in glasses prescription, apathy. No depression or suicidal ideation.    Physical exam:  GEN: This is a well developed, well-nourished female in no acute distress, in a pleasant mood.    SKIN: Focused examination of the face and neck was performed.  -Kearns skin type: II  -Many acneiform papules predominantly on the cheeks, less on the chin and forehead, and rare pustules  -No other lesions of concern on areas examined.     Impression/Plan:    1. Acne vulgaris. Overall stable with minimal side effects. Will have to monitoring vision changes. Patient will discuss with her ophthalmologist. Recommended increasing isotretinoin to 60 mg PO qdaily as goal dose.       Increase isotretinoin from 50 mg to 60 mg PO qdaily      Total cumulative dose: 2600 mg      Goal dose is 120-150mg/kg (8,016-10,020 mg) for this 66.82kg patient.       Method of contraception includes: IUD and male condoms. Patient is also currently abstinent.       Discussed need for sun protection, at least SPF 30+.      Pregnancy test negative 11/21/19      Labs reviewed: CBC w/ diff, LFTs, and lipid panel wnl except for mildly elevated LDL      Will recheck labs and pregnancy test at follow-up in 4 weeks; labs  ordered      Patient's iPledge # is [id number].       Recommended Vanicream, Cetaphil gentle facial cleanser, Vaseline for lips or nose, or gentle moisturizer, etc, to help with skin dryness.     Follow-up in 1 month.     Staff Involved:  Staff only    Nir Roque MD    Department of Dermatology  [facility]: Phone: [phone], Fax:[phone]  [facility]: Phone: [phone], Fax: [phone]

## 2019-11-21 NOTE — PROGRESS NOTES
[facility] Dermatology Note    Dermatology Clinic  [facility]  [address] [place] [address]    Dermatology Problem List:  1. Acne Vulgaris  -Current Tx: Isotretinoin started 9/10/19, increased to 60 mg PO qdaily on 11/21/19  -Previous Tx: adapalene 0.3% gel, sulfamethoxazole-trimethoprim 180 mg BID, clindamycin    Encounter Date: Nov 21, 2019    CC:  Chief Complaint   Patient presents with     Derm Problem     Jessy is here for a accutane follow up, states her acne is getting worse. Experiencing dryness and headaches.        History of Present Illness:  Ms. Jessy Portillo is a 19 year old female who presents as a follow-up for acne. She feels as though her acne is slightly worse than prior, particularly on the face. She is not using any topical therapies. She states she has been tolerating isotretinoin well. Mild dry skin and dry lips. No muscle aches, arthralgias or headaches. She has noted some vision changes; she wears glasses and has noticed dry eyes. No mood changes or suicidal ideation; though patient is concerned given she has had history of depression and mood issues in the past. She has a IUD and using condoms for contraception. Health otherwise stable. No other skin concerns.       Past Medical History:   Patient Active Problem List   Diagnosis     Acute appendicitis     No past medical history on file.  Past Surgical History:   Procedure Laterality Date     LAPAROSCOPIC APPENDECTOMY N/A 12/9/2016    Procedure: LAPAROSCOPIC APPENDECTOMY;  Surgeon: Perry Mckeon MD;  Location:  OR       Social History:  Patient reports that she has never smoked. She has never used smokeless tobacco.   She is a student.    Family History:  No family history on file.   Her sister also had acne per chart review.    Medications:  Current Outpatient Medications   Medication Sig Dispense Refill     ISOtretinoin (ACCUTANE) 10 MG capsule  Take 1 capsule (10 mg) by mouth daily 30 capsule 0     ISOtretinoin (ACCUTANE) 40 MG capsule Take 1 capsule (40 mg) by mouth daily 30 capsule 0     ISOtretinoin 25 MG CAPS Take 50 mg by mouth daily 30 capsule 0       No Known Allergies    Review of Systems:  -As per HPI  -Constitutional: Otherwise feeling well today, in usual state of health.  -Skin: As above in HPI. No additional skin concerns.  - Accutane: +muscle cramping, mild headache, change in glasses prescription, apathy. No depression or suicidal ideation.    Physical exam:  GEN: This is a well developed, well-nourished female in no acute distress, in a pleasant mood.    SKIN: Focused examination of the face and neck was performed.  -Kearns skin type: II  -Many acneiform papules predominantly on the cheeks, less on the chin and forehead, and rare pustules  -No other lesions of concern on areas examined.     Impression/Plan:    1. Acne vulgaris. Overall stable with minimal side effects. Will have to monitoring vision changes. Patient will discuss with her ophthalmologist. Recommended increasing isotretinoin to 60 mg PO qdaily as goal dose.       Increase isotretinoin from 50 mg to 60 mg PO qdaily      Total cumulative dose: 2600 mg      Goal dose is 120-150mg/kg (8,016-10,020 mg) for this 66.82kg patient.       Method of contraception includes: IUD and male condoms. Patient is also currently abstinent.       Discussed need for sun protection, at least SPF 30+.      Pregnancy test negative 11/21/19      Labs reviewed: CBC w/ diff, LFTs, and lipid panel wnl except for mildly elevated LDL      Will recheck labs and pregnancy test at follow-up in 4 weeks; labs ordered      Patient's iPledge # is [id number].       Recommended Vanicream, Cetaphil gentle facial cleanser, Vaseline for lips or nose, or gentle moisturizer, etc, to help with skin dryness.     Follow-up in 1 month.     Staff Involved:  Staff only    Nir Roque MD  Assistant  Professor  Department of Dermatology  [facility]: Phone: [phone], Fax:[phone]  [facility]: Phone: [phone], Fax: [phone]

## 2019-11-21 NOTE — NURSING NOTE
Dermatology Rooming Note    Jessy Chewonjovanny Portillo's goals for this visit include:   Chief Complaint   Patient presents with     Derm Problem     Jessy is here for a accutane follow up, states her acne is getting worse. Experiencing dryness and headaches.        Thi Howard LPN

## 2019-11-22 NOTE — RESULT ENCOUNTER NOTE
Hey - could you let patient know that her labs were stable from prior. Will need to recheck next month. Thanks!    Nir Roque MD    Department of Dermatology  [facility]: Phone: [phone], Fax:[phone]  [facility]: Phone: [phone], Fax: [phone]

## 2019-12-20 ENCOUNTER — OFFICE VISIT (OUTPATIENT)
Dept: DERMATOLOGY | Facility: CLINIC | Age: 19
End: 2019-12-20
Payer: COMMERCIAL

## 2019-12-20 DIAGNOSIS — L70.0 ACNE VULGARIS: ICD-10-CM

## 2019-12-20 DIAGNOSIS — Z51.81 THERAPEUTIC DRUG MONITORING: ICD-10-CM

## 2019-12-20 DIAGNOSIS — Z79.899 ON ISOTRETINOIN THERAPY: ICD-10-CM

## 2019-12-20 DIAGNOSIS — Z51.81 THERAPEUTIC DRUG MONITORING: Primary | ICD-10-CM

## 2019-12-20 LAB
ALBUMIN SERPL-MCNC: 3.6 G/DL (ref 3.4–5)
ALP SERPL-CCNC: 63 U/L (ref 40–150)
ALT SERPL W P-5'-P-CCNC: 17 U/L (ref 0–50)
AST SERPL W P-5'-P-CCNC: 19 U/L (ref 0–45)
BASOPHILS # BLD AUTO: 0 10E9/L (ref 0–0.2)
BASOPHILS NFR BLD AUTO: 0.5 %
BILIRUB DIRECT SERPL-MCNC: <0.1 MG/DL (ref 0–0.2)
BILIRUB SERPL-MCNC: 0.3 MG/DL (ref 0.2–1.3)
CHOLEST SERPL-MCNC: 160 MG/DL
DIFFERENTIAL METHOD BLD: ABNORMAL
EOSINOPHIL # BLD AUTO: 0.5 10E9/L (ref 0–0.7)
EOSINOPHIL NFR BLD AUTO: 8.4 %
ERYTHROCYTE [DISTWIDTH] IN BLOOD BY AUTOMATED COUNT: 13.9 % (ref 10–15)
HCG UR QL: NEGATIVE
HCT VFR BLD AUTO: 39.6 % (ref 35–47)
HDLC SERPL-MCNC: 47 MG/DL
HGB BLD-MCNC: 12.2 G/DL (ref 11.7–15.7)
IMM GRANULOCYTES # BLD: 0 10E9/L (ref 0–0.4)
IMM GRANULOCYTES NFR BLD: 0.3 %
LDLC SERPL CALC-MCNC: 92 MG/DL
LYMPHOCYTES # BLD AUTO: 1.3 10E9/L (ref 0.8–5.3)
LYMPHOCYTES NFR BLD AUTO: 23 %
MCH RBC QN AUTO: 26.6 PG (ref 26.5–33)
MCHC RBC AUTO-ENTMCNC: 30.8 G/DL (ref 31.5–36.5)
MCV RBC AUTO: 87 FL (ref 78–100)
MONOCYTES # BLD AUTO: 1 10E9/L (ref 0–1.3)
MONOCYTES NFR BLD AUTO: 16.3 %
NEUTROPHILS # BLD AUTO: 3 10E9/L (ref 1.6–8.3)
NEUTROPHILS NFR BLD AUTO: 51.5 %
NONHDLC SERPL-MCNC: 112 MG/DL
NRBC # BLD AUTO: 0 10*3/UL
NRBC BLD AUTO-RTO: 0 /100
PLATELET # BLD AUTO: 219 10E9/L (ref 150–450)
PROT SERPL-MCNC: 7.4 G/DL (ref 6.8–8.8)
RBC # BLD AUTO: 4.58 10E12/L (ref 3.8–5.2)
TRIGL SERPL-MCNC: 103 MG/DL
WBC # BLD AUTO: 5.8 10E9/L (ref 4–11)

## 2019-12-20 RX ORDER — ISOTRETINOIN 30 MG/1
60 CAPSULE ORAL DAILY
Qty: 60 CAPSULE | Refills: 0 | Status: SHIPPED | OUTPATIENT
Start: 2019-12-20 | End: 2020-01-20

## 2019-12-20 ASSESSMENT — PAIN SCALES - GENERAL: PAINLEVEL: NO PAIN (0)

## 2019-12-20 NOTE — PROGRESS NOTES
[facility] Dermatology Note    Dermatology Clinic  [facility]  [address] [place] [address]    Dermatology Problem List:  1. Acne Vulgaris  -Current Tx: Isotretinoin 60 mg every day started 9/10/19 - end of month #3  -Previous Tx: adapalene 0.3% gel, sulfamethoxazole-trimethoprim 180 mg BID, clindamycin    Encounter Date: Dec 20, 2019    CC:  Chief Complaint   Patient presents with     Derm Problem     Jessy is here today for a follow up on Accutane. Had a bad reaction for the past two months ut calmed down last week. Says skin is dry.       History of Present Illness:  Ms. Jessy Portillo is a 19 year old female who presents as a follow-up for acne. The patient was last seen on 11/21/19 by Dr. Roque when the dose of isotretinoin was increased to 60 mg every day. At today's visit, the patient notes that two months ago her skin broke out in a purge, but in the last two weeks her skin has calmed down a lot. The patient states she has experienced some headaches and deals with manageable dryness. The patient reports tolerable mucocutaneous dryness, and denies arthralgias, myalgias, depression, suicidal ideation, diarrhea, headache, or blurred vision.     Past Medical History:   Patient Active Problem List   Diagnosis     Acute appendicitis     No past medical history on file.  Past Surgical History:   Procedure Laterality Date     LAPAROSCOPIC APPENDECTOMY N/A 12/9/2016    Procedure: LAPAROSCOPIC APPENDECTOMY;  Surgeon: Perry Mckeon MD;  Location:  OR       Social History:  Patient reports that she has never smoked. She has never used smokeless tobacco.   She is a student.    Family History:  No family history on file.   Her sister also had acne per chart review.    Medications:  Current Outpatient Medications   Medication Sig Dispense Refill     ISOtretinoin (ABSORICA) 30 MG capsule Take 2 capsules (60 mg) by mouth daily 60  capsule 0     ISOtretinoin (ACCUTANE) 10 MG capsule Take 1 capsule (10 mg) by mouth daily 30 capsule 0     ISOtretinoin (ACCUTANE) 40 MG capsule Take 1 capsule (40 mg) by mouth daily 30 capsule 0     ISOtretinoin 25 MG CAPS Take 50 mg by mouth daily 30 capsule 0       No Known Allergies    Review of Systems:  -Neuro: no HA or vision changes  -GI: No nausea, blood in stool, diarrhea, hx of IBD  -Psych: no depression/anxiety, mood changes, or sleep problems   -Musculoskeletal: no joint or muscle pain or swelling   -Heme/Lymph: no concerning bumps, no bleeding problems  -Constitutional: Otherwise feeling well today, in usual state of health.  -Skin: As above in HPI. No additional skin concerns.    Physical exam:  GEN: This is a well developed, well-nourished female in no acute distress, in a pleasant mood.    SKIN: Focused examination of the face and neck was performed.  -Kearns skin type: II  -numerous flat erythematous macules on the bilateral cheeks. Few active papules today. No pustules. Improving.   -No other lesions of concern on areas examined.     Impression/Plan:  1. Acne vulgaris - on isotretinoin, end of month #3    At this visit we will continue isotretinoin 60 mg every day. Goal dose is 120-150mg/kg (8,016-10,020 mg) for this 66.82kg patient.     Method of contraception includes: IUD and male condoms. Patient is also currently abstinent.     Discussed need for sun protection, at least SPF 30+.    Labs including CBC, BUN/Cr, fasting lipids and AST/ALT will be obtained.     Qualitative hCG was also obtained    Patient's iPledge # is [id number].     Recommended Vanicream, Cetaphil gentle facial cleanser, Vaseline for lips or nose, or gentle moisturizer, etc, to help with skin dryness.     Total dose: 4400 mg (65.85 mg/kg)    Staff Involved:  Staff/Scribe    Scribe Disclosure:  Cornel SALVADOR, am serving as a scribe to document services personally performed by Italia Shipley PA-C, based on data collection  and the provider's statements to me.     Provider Disclosure:   The documentation recorded by the scribe accurately reflects the services I personally performed and the decisions made by me.    All risks, benefits and alternatives were discussed with patient.  Patient is in agreement and understands the assessment and plan.  All questions were answered.    Italia Shipley PA-C, MPAS  [facility]: Phone: [phone], Fax: [phone]  [facility]: Phone: [phone],  Fax: [phone]

## 2019-12-20 NOTE — NURSING NOTE
Chief Complaint   Patient presents with     Derm Problem     Jessy is here today for a follow up on Accutane. Had a bad reaction for the past two months ut calmed down last week. Says skin is dry.     JUAN GARCES on 12/20/2019 at 9:21 AM

## 2019-12-20 NOTE — PATIENT INSTRUCTIONS
Sun protective clothing and Resources     Lands End (www.Lodgeo.com)  Athleta (www.[email or web address])  Froilan Life (www.aitainmentanalSynergos.Veritract)  Carve Designs (PhotoPharmics) - affordable  Skinz (Relume Technologiesskinz.com)    Long sleeve - Chelsea Cool DRI UPF 50 or [place] PFG UPF 50  Hoodie - [place] PFG UPF 50  Swimshirt/Rash Guard - Debra UPF 50 (on Amazon)  Neck - Outdoor Research Ubertubes (www.outdoorresearch.com)

## 2019-12-20 NOTE — RESULT ENCOUNTER NOTE
Labs stable. Pregnancy test still pending. Seen today by Dr. Shipley for accutane follow-up.     Nir Roque MD    Department of Dermatology  [facility]: Phone: [phone], Fax:[phone]  [facility]: Phone: [phone], Fax: [phone]

## 2019-12-20 NOTE — LETTER
12/20/2019       RE: Jessy Portillo  [address] [facility] [address]     Dear Colleague,    Thank you for referring your patient, Jessy Portillo, to the [facility] DERMATOLOGY at [facility]. Please see a copy of my visit note below.    [facility] Dermatology Note    Dermatology Clinic  [facility]  [address] [place] [address]    Dermatology Problem List:  1. Acne Vulgaris  -Current Tx: Isotretinoin 60 mg every day started 9/10/19 - end of month #3  -Previous Tx: adapalene 0.3% gel, sulfamethoxazole-trimethoprim 180 mg BID, clindamycin    Encounter Date: Dec 20, 2019    CC:  Chief Complaint   Patient presents with     Derm Problem     Jessy is here today for a follow up on Accutane. Had a bad reaction for the past two months ut calmed down last week. Says skin is dry.       History of Present Illness:  Ms. Jessy Portillo is a 19 year old female who presents as a follow-up for acne. The patient was last seen on 11/21/19 by Dr. Roque when the dose of isotretinoin was increased to 60 mg every day. At today's visit, the patient notes that two months ago her skin broke out in a purge, but in the last two weeks her skin has calmed down a lot. The patient states she has experienced some headaches and deals with manageable dryness. The patient reports tolerable mucocutaneous dryness, and denies arthralgias, myalgias, depression, suicidal ideation, diarrhea, headache, or blurred vision.     Past Medical History:   Patient Active Problem List   Diagnosis     Acute appendicitis     No past medical history on file.  Past Surgical History:   Procedure Laterality Date     LAPAROSCOPIC APPENDECTOMY N/A 12/9/2016    Procedure: LAPAROSCOPIC APPENDECTOMY;  Surgeon: Perry Mckeon MD;  Location:  OR       Social History:  Patient reports that she has never smoked. She has never used  smokeless tobacco.   She is a student.    Family History:  No family history on file.   Her sister also had acne per chart review.    Medications:  Current Outpatient Medications   Medication Sig Dispense Refill     ISOtretinoin (ABSORICA) 30 MG capsule Take 2 capsules (60 mg) by mouth daily 60 capsule 0     ISOtretinoin (ACCUTANE) 10 MG capsule Take 1 capsule (10 mg) by mouth daily 30 capsule 0     ISOtretinoin (ACCUTANE) 40 MG capsule Take 1 capsule (40 mg) by mouth daily 30 capsule 0     ISOtretinoin 25 MG CAPS Take 50 mg by mouth daily 30 capsule 0       No Known Allergies    Review of Systems:  -Neuro: no HA or vision changes  -GI: No nausea, blood in stool, diarrhea, hx of IBD  -Psych: no depression/anxiety, mood changes, or sleep problems   -Musculoskeletal: no joint or muscle pain or swelling   -Heme/Lymph: no concerning bumps, no bleeding problems  -Constitutional: Otherwise feeling well today, in usual state of health.  -Skin: As above in HPI. No additional skin concerns.    Physical exam:  GEN: This is a well developed, well-nourished female in no acute distress, in a pleasant mood.    SKIN: Focused examination of the face and neck was performed.  -Kearns skin type: II  -numerous flat erythematous macules on the bilateral cheeks. Few active papules today. No pustules. Improving.   -No other lesions of concern on areas examined.     Impression/Plan:  1. Acne vulgaris - on isotretinoin, end of month #3    At this visit we will continue isotretinoin 60 mg every day. Goal dose is 120-150mg/kg (8,016-10,020 mg) for this 66.82kg patient.     Method of contraception includes: IUD and male condoms. Patient is also currently abstinent.     Discussed need for sun protection, at least SPF 30+.    Labs including CBC, BUN/Cr, fasting lipids and AST/ALT will be obtained.     Qualitative hCG was also obtained    Patient's iPledge # is [id number].     Recommended Vanicream, Cetaphil gentle facial cleanser, Vaseline  for lips or nose, or gentle moisturizer, etc, to help with skin dryness.     Total dose: 4400 mg (65.85 mg/kg)    Staff Involved:  Staff/Scribe    Scribe Disclosure:  I, Cornel Riddle, am serving as a scribe to document services personally performed by Italia Shiplye PA-C, based on data collection and the provider's statements to me.     Provider Disclosure:   The documentation recorded by the scribe accurately reflects the services I personally performed and the decisions made by me.    All risks, benefits and alternatives were discussed with patient.  Patient is in agreement and understands the assessment and plan.  All questions were answered.    Italia Shipley PA-C, MPAS  [facility] [place]: Phone: [phone], Fax: [phone]  [facility]: Phone: [phone],  Fax: [phone]

## 2020-01-20 ENCOUNTER — OFFICE VISIT (OUTPATIENT)
Dept: DERMATOLOGY | Facility: CLINIC | Age: 20
End: 2020-01-20
Payer: COMMERCIAL

## 2020-01-20 DIAGNOSIS — Z51.81 MEDICATION MONITORING ENCOUNTER: Primary | ICD-10-CM

## 2020-01-20 DIAGNOSIS — Z79.899 ON ISOTRETINOIN THERAPY: ICD-10-CM

## 2020-01-20 DIAGNOSIS — L70.0 ACNE VULGARIS: ICD-10-CM

## 2020-01-20 LAB — HCG UR QL: NEGATIVE

## 2020-01-20 RX ORDER — ISOTRETINOIN 30 MG/1
60 CAPSULE ORAL DAILY
Qty: 60 CAPSULE | Refills: 0 | Status: SHIPPED | OUTPATIENT
Start: 2020-01-20 | End: 2020-02-17

## 2020-01-20 ASSESSMENT — PAIN SCALES - GENERAL: PAINLEVEL: NO PAIN (0)

## 2020-01-20 NOTE — LETTER
"1/20/2020       RE: Jessy Portillo  [address] [facility] [address]     Dear Colleague,    Thank you for referring your patient, Jessy Portillo, to the [facility] DERMATOLOGY at [facility]. Please see a copy of my visit note below.    [facility] Dermatology Note    Dermatology Clinic  [facility]  [address] [place] [address]    Dermatology Problem List:  1. Acne Vulgaris  -Current Tx: Isotretinoin 60 mg every day started 9/10/19 - end of month #4  -Previous Tx: adapalene 0.3% gel, sulfamethoxazole-trimethoprim 180 mg BID, clindamycin    Encounter Date: Jan 20, 2020    CC:  Chief Complaint   Patient presents with     Accutane     Jessy is here today for an accutane follow up.        History of Present Illness:  Ms. Jessy Portillo is a 19 year old female who presents as a follow-up for acne. The patient was last seen on 12/20/2019 when isotretinoin 60 mg every day was continued. At today's visit, the patient notes her acne has calmed down since the last month. States she thinks the \"purge stage\" is over. The patient states she is still dealing with dryness but notes it is manageable with moisturizer. Additionally, the patient voices concern over scarring due to acne. Would like to discuss options. She denies additional lesions or areas of concern. The patient denies painful, itching, tingling or bleeding lesions unless otherwise noted.    Past Medical History:   Patient Active Problem List   Diagnosis     Acute appendicitis     No past medical history on file.  Past Surgical History:   Procedure Laterality Date     LAPAROSCOPIC APPENDECTOMY N/A 12/9/2016    Procedure: LAPAROSCOPIC APPENDECTOMY;  Surgeon: Perry Mckeon MD;  Location:  OR       Social History:  Patient reports that she has never smoked. She has never used smokeless tobacco.   She is a " student.    Family History:  No family history on file.   Her sister also had acne per chart review.    Medications:  Current Outpatient Medications   Medication Sig Dispense Refill     ISOtretinoin (ABSORICA) 30 MG capsule Take 2 capsules (60 mg) by mouth daily 60 capsule 0       No Known Allergies    Review of Systems:  -Neuro: no HA or vision changes  -GI: No nausea, blood in stool, diarrhea, hx of IBD  -Psych: no depression/anxiety, mood changes, or sleep problems   -Musculoskeletal: no joint or muscle pain or swelling   -Heme/Lymph: no concerning bumps, no bleeding problems  -Constitutional: Otherwise feeling well today, in usual state of health.  -Skin: As above in HPI. No additional skin concerns.    Physical exam:  GEN: This is a well developed, well-nourished female in no acute distress, in a pleasant mood.    SKIN: Focused examination of the face and neck was performed.  -Kearns skin type: II  -numerous flat erythematous macules on the bilateral cheeks. Few active papules today. No pustules. Improving.   -No other lesions of concern on areas examined.     Impression/Plan:  1.         Acne scarring/PIH    Educated on the etiology    Will start tretinoin 0.025% post-accutane    Can consider laser treatment if patient requests post-accutane, discussed with her briefly today    2. Acne vulgaris - on isotretinoin, end of month #4    At this visit we will continue isotretinoin 60 mg every day. Goal dose is 120-150mg/kg (8,016-10,020 mg) for this 66.82kg patient.     Method of contraception includes: IUD and male condoms. Patient is also currently abstinent.     Discussed need for sun protection, at least SPF 30+.    Previous labs reviewed and found wnl. No further labs necessary.     Qualitative hCG was also obtained    Patient's iPledge # is [id number].     Recommended Vanicream, Cetaphil gentle facial cleanser, Vaseline for lips or nose, or gentle moisturizer, etc, to help with skin dryness.     Total  dose: 6200 mg (92.79 mg/kg)     CC Dr. Bautista at close of this encounter    Staff Involved:  Staff/Scribe    Scribe Disclosure:  I, Cornel Riddle, am serving as a scribe to document services personally performed by Italia Shipley PA-C, based on data collection and the provider's statements to me.       Provider Disclosure:   The documentation recorded by the scribe accurately reflects the services I personally performed and the decisions made by me.    All risks, benefits and alternatives were discussed with patient.  Patient is in agreement and understands the assessment and plan.  All questions were answered.    Italia Shipley PA-C, MPAS  [facility] [place]: Phone: [phone], Fax: [phone]  [facility]: Phone: [phone],  Fax: [phone]

## 2020-01-20 NOTE — NURSING NOTE
Dermatology Rooming Note    Jessy Portillo's goals for this visit include:   Chief Complaint   Patient presents with     Accutane     Jessy is here today for an accutane follow up.      MAXIMINO Temple

## 2020-01-20 NOTE — PROGRESS NOTES
"[facility] Dermatology Note    Dermatology Clinic  [facility]  [address] [place] [address]    Dermatology Problem List:  1. Acne Vulgaris  -Current Tx: Isotretinoin 60 mg every day started 9/10/19 - end of month #4  -Previous Tx: adapalene 0.3% gel, sulfamethoxazole-trimethoprim 180 mg BID, clindamycin    Encounter Date: Jan 20, 2020    CC:  Chief Complaint   Patient presents with     Accutane     Jessy is here today for an accutane follow up.        History of Present Illness:  Ms. Jessy Poritllo is a 19 year old female who presents as a follow-up for acne. The patient was last seen on 12/20/2019 when isotretinoin 60 mg every day was continued. At today's visit, the patient notes her acne has calmed down since the last month. States she thinks the \"purge stage\" is over. The patient states she is still dealing with dryness but notes it is manageable with moisturizer. Additionally, the patient voices concern over scarring due to acne. Would like to discuss options. She denies additional lesions or areas of concern. The patient denies painful, itching, tingling or bleeding lesions unless otherwise noted.    Past Medical History:   Patient Active Problem List   Diagnosis     Acute appendicitis     No past medical history on file.  Past Surgical History:   Procedure Laterality Date     LAPAROSCOPIC APPENDECTOMY N/A 12/9/2016    Procedure: LAPAROSCOPIC APPENDECTOMY;  Surgeon: Perry Mckeon MD;  Location:  OR       Social History:  Patient reports that she has never smoked. She has never used smokeless tobacco.   She is a student.    Family History:  No family history on file.   Her sister also had acne per chart review.    Medications:  Current Outpatient Medications   Medication Sig Dispense Refill     ISOtretinoin (ABSORICA) 30 MG capsule Take 2 capsules (60 mg) by mouth daily 60 capsule 0       No Known Allergies    Review " of Systems:  -Neuro: no HA or vision changes  -GI: No nausea, blood in stool, diarrhea, hx of IBD  -Psych: no depression/anxiety, mood changes, or sleep problems   -Musculoskeletal: no joint or muscle pain or swelling   -Heme/Lymph: no concerning bumps, no bleeding problems  -Constitutional: Otherwise feeling well today, in usual state of health.  -Skin: As above in HPI. No additional skin concerns.    Physical exam:  GEN: This is a well developed, well-nourished female in no acute distress, in a pleasant mood.    SKIN: Focused examination of the face and neck was performed.  -Kearns skin type: II  -numerous flat erythematous macules on the bilateral cheeks. Few active papules today. No pustules. Improving.   -No other lesions of concern on areas examined.     Impression/Plan:  1.         Acne scarring/PIH    Educated on the etiology    Will start tretinoin 0.025% post-accutane    Can consider laser treatment if patient requests post-accutane, discussed with her briefly today    2. Acne vulgaris - on isotretinoin, end of month #4    At this visit we will continue isotretinoin 60 mg every day. Goal dose is 120-150mg/kg (8,016-10,020 mg) for this 66.82kg patient.     Method of contraception includes: IUD and male condoms. Patient is also currently abstinent.     Discussed need for sun protection, at least SPF 30+.    Previous labs reviewed and found wnl. No further labs necessary.     Qualitative hCG was also obtained    Patient's iPledge # is [id number].     Recommended Vanicream, Cetaphil gentle facial cleanser, Vaseline for lips or nose, or gentle moisturizer, etc, to help with skin dryness.     Total dose: 6200 mg (92.79 mg/kg)     CC Dr. Bautista at close of this encounter    Staff Involved:  Staff/Scribe    Scribe Disclosure:  I, Cornel Riddle, am serving as a scribe to document services personally performed by Italia Shipley PA-C, based on data collection and the provider's statements to me.       Provider  Disclosure:   The documentation recorded by the scribe accurately reflects the services I personally performed and the decisions made by me.    All risks, benefits and alternatives were discussed with patient.  Patient is in agreement and understands the assessment and plan.  All questions were answered.    Italia Shipley PA-C, MPAS  [facility] [place]: Phone: [phone], Fax: [phone]  [facility]: Phone: [phone],  Fax: [phone]

## 2020-02-10 ENCOUNTER — HEALTH MAINTENANCE LETTER (OUTPATIENT)
Age: 20
End: 2020-02-10

## 2020-02-17 ENCOUNTER — OFFICE VISIT (OUTPATIENT)
Dept: DERMATOLOGY | Facility: CLINIC | Age: 20
End: 2020-02-17
Payer: COMMERCIAL

## 2020-02-17 DIAGNOSIS — L70.0 ACNE VULGARIS: Primary | ICD-10-CM

## 2020-02-17 DIAGNOSIS — Z79.899 ON ISOTRETINOIN THERAPY: ICD-10-CM

## 2020-02-17 LAB — HCG UR QL: NEGATIVE

## 2020-02-17 RX ORDER — ISOTRETINOIN 30 MG/1
60 CAPSULE ORAL DAILY
Qty: 60 CAPSULE | Refills: 0 | Status: SHIPPED | OUTPATIENT
Start: 2020-02-17 | End: 2020-03-26

## 2020-02-17 ASSESSMENT — PAIN SCALES - GENERAL: PAINLEVEL: NO PAIN (0)

## 2020-02-17 NOTE — PATIENT INSTRUCTIONS
Sun protective clothing and Resources     Lands End (www.CureDM.com)  Athleta (www.[email or web address] Pharma)  Froilan Life (www.[email or web address].[email or web address] Pharma)  Carve Designs (Kyma Technologies) - affordable  Skinz (uvskinz.com)    Long sleeve - Chelsea Cool DRI UPF 50 or [place] PFG UPF 50  Romeoie - [place] PFG UPF 50  Swimshirt/Rash Guard - Debra UPF 50 (on Amazon)  Neck - Outdoor Research Ubertubes (www.outdoorresearch.com)                Elta MD - facial sunscreen   [email or web address]

## 2020-02-17 NOTE — LETTER
2/17/2020       RE: Jessy Portillo  [address] [facility] [address]     Dear Colleague,    Thank you for referring your patient, Jessy Portillo, to the [facility] DERMATOLOGY at [facility]. Please see a copy of my visit note below.    [facility] Dermatology Note    Dermatology Clinic  [facility]  Clinics and Surgery Center  [address] [place] [address]    Dermatology Problem List:  1. Acne Vulgaris  -Current Tx: Isotretinoin 60 mg every day started 9/10/19 - end of month #5  -Previous Tx: adapalene 0.3% gel, sulfamethoxazole-trimethoprim 180 mg BID, clindamycin    Encounter Date: Feb 17, 2020    CC:  Chief Complaint   Patient presents with     Accutane     Jessy is here today for an accutane follow up.        History of Present Illness:  Ms. Jessy Portillo is a 19 year old female who presents as a follow-up for acne. The patient was last seen on 1/20/2020 when isotretinoin 60 mg every day was continued for acne vulgaris. At today's visit, the patient notes she is doing well on accutane on the last month. The patient states she has stopped getting accutane associated headaches. She notes she is still experiencing dryness as well. The patient adds that she has some textured bumps on the face. The patient notes she will be going to [place] on March 6th and will be coming back on the 16th of March. Additionally, the patient states she experiences wind burn when she bikes outside. The patient denies additional lesions or areas of concern. The patient reports tolerable mucocutaneous dryness, and denies arthralgias, myalgias, depression, suicidal ideation, diarrhea, headache, or blurred vision.      Past Medical History:   Patient Active Problem List   Diagnosis     Acute appendicitis     No past medical history on file.  Past Surgical History:   Procedure Laterality Date     LAPAROSCOPIC APPENDECTOMY  N/A 12/9/2016    Procedure: LAPAROSCOPIC APPENDECTOMY;  Surgeon: Perry Mckeon MD;  Location:  OR       Social History:  Patient reports that she has never smoked. She has never used smokeless tobacco.   She is a student.    Family History:  No family history on file.   Her sister also had acne per chart review.    Medications:  Current Outpatient Medications   Medication Sig Dispense Refill     ISOtretinoin (ABSORICA) 30 MG capsule Take 2 capsules (60 mg) by mouth daily 60 capsule 0       No Known Allergies    Review of Systems:  -Neuro: no HA or vision changes  -GI: No nausea, blood in stool, diarrhea, hx of IBD  -Psych: no depression/anxiety, mood changes, or sleep problems   -Musculoskeletal: no joint or muscle pain or swelling   -Heme/Lymph: no concerning bumps, no bleeding problems  -Constitutional: Otherwise feeling well today, in usual state of health.  -Skin: As above in HPI. No additional skin concerns.    Physical exam:  GEN: This is a well developed, well-nourished female in no acute distress, in a pleasant mood.    SKIN: Focused examination of the face and neck was performed.  -Kearns skin type: II  -numerous flat erythematous macules on the bilateral cheeks. Few active papules today. No pustules. Improved since the last visit.  -No other lesions of concern on areas examined.     Impression/Plan:  1.  Acne scarring/PIH  - Educated on the etiology  - Will start tretinoin 0.025% post-accutane  - Can consider laser treatment if patient requests post-accutane, discussed with her briefly today    2.  Acne vulgaris - on isotretinoin, end of month #5  - At this visit we will continue isotretinoin 60 mg every day. Goal dose is 120-150mg/kg (8,016-10,020 mg) for this 66.82kg patient.   - Method of contraception includes: IUD and male condoms. Patient is also currently abstinent.   - Discussed need for sun protection, at least SPF 30+. Patient going to [place] for vacation at beginning of March.  Sunscreen handout provided and discussed.   - Sunscreen handout provided. Recommended EltaMD as a facial sunscreen  - Previous labs reviewed and found wnl. No further labs necessary.   - Qualitative hCG was also obtained  - Patient's iPledge # is [id number].   - Recommended Vanicream, Cetaphil gentle facial cleanser, Vaseline for lips or nose, or gentle moisturizer, etc, to help with skin dryness.   - Total dose: 8000 mg (119.72 mg/kg)    CC Dr. Bautista at close of this encounter  Follow up in 1 month, earlier for new or changing lesions    Staff Involved:  Staff/Scribe    Scribe Disclosure:  I, Cornel Riddle, am serving as a scribe to document services personally performed by Italia Shipley PA-C, based on data collection and the provider's statements to me.   Provider Disclosure:   The documentation recorded by the scribe accurately reflects the services I personally performed and the decisions made by me.    All risks, benefits and alternatives were discussed with patient.  Patient is in agreement and understands the assessment and plan.  All questions were answered.    Italia Shipley PA-C, MPAS  [facility] [place]: Phone: [phone], Fax: [phone]  [facility]: Phone: [phone],  Fax: [phone]

## 2020-02-17 NOTE — PROGRESS NOTES
[facility] Dermatology Note    Dermatology Clinic  [facility]  Clinics and Surgery Center  [address] [place] [address]    Dermatology Problem List:  1. Acne Vulgaris  -Current Tx: Isotretinoin 60 mg every day started 9/10/19 - end of month #5  -Previous Tx: adapalene 0.3% gel, sulfamethoxazole-trimethoprim 180 mg BID, clindamycin    Encounter Date: Feb 17, 2020    CC:  Chief Complaint   Patient presents with     Accutane     Jesys is here today for an accutane follow up.        History of Present Illness:  Ms. Jessy Portillo is a 19 year old female who presents as a follow-up for acne. The patient was last seen on 1/20/2020 when isotretinoin 60 mg every day was continued for acne vulgaris. At today's visit, the patient notes she is doing well on accutane on the last month. The patient states she has stopped getting accutane associated headaches. She notes she is still experiencing dryness as well. The patient adds that she has some textured bumps on the face. The patient notes she will be going to [place] on March 6th and will be coming back on the 16th of March. Additionally, the patient states she experiences wind burn when she bikes outside. The patient denies additional lesions or areas of concern. The patient reports tolerable mucocutaneous dryness, and denies arthralgias, myalgias, depression, suicidal ideation, diarrhea, headache, or blurred vision.      Past Medical History:   Patient Active Problem List   Diagnosis     Acute appendicitis     No past medical history on file.  Past Surgical History:   Procedure Laterality Date     LAPAROSCOPIC APPENDECTOMY N/A 12/9/2016    Procedure: LAPAROSCOPIC APPENDECTOMY;  Surgeon: Perry Mckeon MD;  Location:  OR       Social History:  Patient reports that she has never smoked. She has never used smokeless tobacco.   She is a student.    Family History:  No family history on file.   Her sister also had  acne per chart review.    Medications:  Current Outpatient Medications   Medication Sig Dispense Refill     ISOtretinoin (ABSORICA) 30 MG capsule Take 2 capsules (60 mg) by mouth daily 60 capsule 0       No Known Allergies    Review of Systems:  -Neuro: no HA or vision changes  -GI: No nausea, blood in stool, diarrhea, hx of IBD  -Psych: no depression/anxiety, mood changes, or sleep problems   -Musculoskeletal: no joint or muscle pain or swelling   -Heme/Lymph: no concerning bumps, no bleeding problems  -Constitutional: Otherwise feeling well today, in usual state of health.  -Skin: As above in HPI. No additional skin concerns.    Physical exam:  GEN: This is a well developed, well-nourished female in no acute distress, in a pleasant mood.    SKIN: Focused examination of the face and neck was performed.  -Kearns skin type: II  -numerous flat erythematous macules on the bilateral cheeks. Few active papules today. No pustules. Improved since the last visit.  -No other lesions of concern on areas examined.     Impression/Plan:  1.  Acne scarring/PIH  - Educated on the etiology  - Will start tretinoin 0.025% post-accutane  - Can consider laser treatment if patient requests post-accutane, discussed with her briefly today    2.  Acne vulgaris - on isotretinoin, end of month #5  - At this visit we will continue isotretinoin 60 mg every day. Goal dose is 120-150mg/kg (8,016-10,020 mg) for this 66.82kg patient.   - Method of contraception includes: IUD and male condoms. Patient is also currently abstinent.   - Discussed need for sun protection, at least SPF 30+. Patient going to [place] [facility] for vacation at beginning of March. Sunscreen handout provided and discussed.   - Sunscreen handout provided. Recommended EltaMD as a facial sunscreen  - Previous labs reviewed and found wnl. No further labs necessary.   - Qualitative hCG was also obtained  - Patient's iPledge # is [id number].   - Recommended Vanicream, Cetaphil gentle  facial cleanser, Vaseline for lips or nose, or gentle moisturizer, etc, to help with skin dryness.   - Total dose: 8000 mg (119.72 mg/kg)    CC Dr. Bautista at close of this encounter  Follow up in 1 month, earlier for new or changing lesions    Staff Involved:  Staff/Scribe    Scribe Disclosure:  I, Cornel Riddle, am serving as a scribe to document services personally performed by Italia Shipley PA-C, based on data collection and the provider's statements to me.   Provider Disclosure:   The documentation recorded by the scribe accurately reflects the services I personally performed and the decisions made by me.    All risks, benefits and alternatives were discussed with patient.  Patient is in agreement and understands the assessment and plan.  All questions were answered.    Italia Shipley PA-C, MPAS  [facility] [place]: Phone: [phone], Fax: [phone]  [facility]: Phone: [phone],  Fax: [phone]

## 2020-03-17 ENCOUNTER — TELEPHONE (OUTPATIENT)
Dept: DERMATOLOGY | Facility: CLINIC | Age: 20
End: 2020-03-17

## 2020-03-17 NOTE — TELEPHONE ENCOUNTER
Called pt and LVM. I wanted to know if she wanted to do a phone follow up on 3/26/20. Clinic number provided.  MAXIMINO Temple

## 2020-03-24 DIAGNOSIS — Z79.899 ON ISOTRETINOIN THERAPY: Primary | ICD-10-CM

## 2020-03-24 NOTE — PROGRESS NOTES
Courtesy urine pregnancy test as requested by patient for MICHAEL Shipley.     Celena Briones MD (PGY-2)  Dermatology Resident   Pager: [phone]

## 2020-03-26 ENCOUNTER — VIRTUAL VISIT (OUTPATIENT)
Dept: DERMATOLOGY | Facility: CLINIC | Age: 20
End: 2020-03-26
Payer: COMMERCIAL

## 2020-03-26 DIAGNOSIS — L70.0 ACNE VULGARIS: ICD-10-CM

## 2020-03-26 DIAGNOSIS — L81.0 POST-INFLAMMATORY HYPERPIGMENTATION: ICD-10-CM

## 2020-03-26 DIAGNOSIS — Z79.899 ON ISOTRETINOIN THERAPY: Primary | ICD-10-CM

## 2020-03-26 RX ORDER — ISOTRETINOIN 30 MG/1
60 CAPSULE ORAL DAILY
Qty: 60 CAPSULE | Refills: 0 | Status: SHIPPED | OUTPATIENT
Start: 2020-03-26 | End: 2020-04-20

## 2020-03-26 ASSESSMENT — PAIN SCALES - GENERAL: PAINLEVEL: NO PAIN (0)

## 2020-03-26 NOTE — PROGRESS NOTES
Dermatology Rooming Note    Jessy Portillo's goals for this visit include:   Chief Complaint   Patient presents with     Accutane     Jessy is following up on accutane.      MAXIMINO Temple

## 2020-03-26 NOTE — PROGRESS NOTES
"Jessy Portillo is a 20 year old female who is being evaluated via a billable telephone visit.      Dermatology Problem List:  1. Acne Vulgaris  -Current Tx: Isotretinoin 60 mg every day started 9/10/19 - end of month #6  -Previous Tx: adapalene 0.3% gel, sulfamethoxazole-trimethoprim 180 mg BID, clindamycin    The patient has been notified of following:     \"This telephone visit will be conducted via a call between you and your physician/provider. We have found that certain health care needs can be provided without the need for a physical exam.  This service lets us provide the care you need with a short phone conversation.  If a prescription is necessary we can send it directly to your pharmacy.  If lab work is needed we can place an order for that and you can then stop by our lab to have the test done at a later time.    If during the course of the call the physician/provider feels a telephone visit is not appropriate, you will not be charged for this service.\"     The patient has verbally consented to this service.      Jessy Portillo complains of    Chief Complaint   Patient presents with     Accutane     Jessy is following up on accutane.        I have reviewed and updated the patient's Past Medical History, Social History, Family History and Medication List.    ALLERGIES  Patient has no known allergies.      Additional provider notes:     I spoke with the patient.    The reason for he telephone visit was isotretinoin follow-up/acne scarring    Pertinent history and ROS    Jessy Portillo is a 21yo patient who has been on isotretinoin 60mg for the past several months. She recently traveled to [place] [facility] and came back 10 days ago. She notes her acne got a lost worse while she was there, but now things seem to be calming down, She notes she was wearing sunscreen there and did not get burned. She states she still gets larger pimples on her cheeks, which is always where she tends to get " them. She overall notes improvement in her acne. The patient reports tolerable mucocutaneous dryness, and denies arthralgias, myalgias, depression, suicidal ideation, diarrhea, headache, or blurred vision.      Review of Systems:  -Neuro: no HA or vision changes  -GI: No nausea, blood in stool, diarrhea, hx of IBD  -Psych: no depression/anxiety, mood changes, or sleep problems   -Musculoskeletal: no joint or muscle pain or swelling   -Heme/Lymph: no concerning bumps, no bleeding problems  -Constitutional: Otherwise feeling well today, in usual state of health.  -Skin: As above in HPI. No additional skin concerns.    Assessment/Plan:  1.  Acne scarring/PIH  - Educated on the etiology  - Will start tretinoin 0.025% post-accutane  - Can consider laser treatment if patient requests post-accutane, discussed with her briefly today     2.  Acne vulgaris - on isotretinoin, end of month #6. Would like her to go 30 days without new breakouts.   - At this visit we will continue isotretinoin 60 mg every day. Goal dose is 120-150mg/kg (8,016-10,020 mg) for this 66.82kg patient.   - Method of contraception includes: IUD and male condoms. Patient is also currently abstinent.   - Sunscreen handout provided. Recommended EltaMD as a facial sunscreen  - Previous labs reviewed and found wnl. No further labs necessary.   - Qualitative hCG was also obtained  - Patient's iPledge # is [id number].   - Recommended Vanicream, Cetaphil gentle facial cleanser, Vaseline for lips or nose, or gentle moisturizer, etc, to help with skin dryness.   - Total dose: 9800 mg (146.66 mg/kg)    I have reviewed the note as documented above.  This accurately captures the substance of my conversation with the patient.    CC Dr. Bautista at the close of this encounter.  Follow-up 1mo, earlier for new or changing lesions    Phone call contact time  Call Started at 3:39pm  Call Ended at 3:48pm    All risks, benefits and alternatives were discussed with  patient.  Patient is in agreement and understands the assessment and plan.  All questions were answered.    Italia Shipley PA-C, MPAS  [facility]: Phone: [phone], Fax: [phone]  [facility]: Phone: [phone],  Fax: [phone]

## 2020-03-26 NOTE — PATIENT INSTRUCTIONS
[facility] Teledermatology Visit    Thank you for allowing us to participate in your care. Your findings are consistent with acne. Your instructions are as follows:  1. Continue isotretinoin 60mg daily as you have been  2. Continue with sun protection - at least SPF 30 daily  3. Take photographs to upload to [employer] prior to next visit  4. Have urine pregnancy test done at the lab - need to  medication within 7 days of today's phone call (today is day number 1)    Your follow-up instructions are as follows:  1. Follow-up with dermatology 1mo    Italia Shipley PA-C, MPAS  [facility] [place]: Phone: [phone], Fax: [phone]  [facility]: Phone: [phone],  Fax: [phone]            When should I call my doctor?    If you are worsening or not improving, please, contact us or seek urgent care as noted below.     Who should I call with questions?    [facility]: [phone]     [facility]: [phone]    If this is a medical emergency and you are unable to reach an ER, Call [phone]

## 2020-03-27 DIAGNOSIS — Z79.899 ON ISOTRETINOIN THERAPY: ICD-10-CM

## 2020-03-27 LAB — HCG UR QL: NEGATIVE

## 2020-04-20 ENCOUNTER — VIRTUAL VISIT (OUTPATIENT)
Dept: DERMATOLOGY | Facility: CLINIC | Age: 20
End: 2020-04-20
Payer: COMMERCIAL

## 2020-04-20 DIAGNOSIS — L70.0 ACNE VULGARIS: ICD-10-CM

## 2020-04-20 RX ORDER — ISOTRETINOIN 30 MG/1
60 CAPSULE ORAL DAILY
Qty: 60 CAPSULE | Refills: 0 | Status: SHIPPED | OUTPATIENT
Start: 2020-04-20 | End: 2020-07-09

## 2020-04-20 ASSESSMENT — PAIN SCALES - GENERAL: PAINLEVEL: NO PAIN (0)

## 2020-04-20 NOTE — PROGRESS NOTES
Dermatology Phone Visit    Dermatology Problem List:  1. Acne Vulgaris  -Current Tx: Isotretinoin 60 mg every day started 9/10/19 - end of month #7  -Previous Tx: adapalene 0.3% gel, sulfamethoxazole-trimethoprim 180 mg BID, clindamycin    Patient opted to conduct today's return visit via telephone vs an in person visit to the clinic.    Encounter Date: 2020    Chief complaint:   Chief Complaint   Patient presents with     Derm Problem     Acne - Accutane. Notes improvement, no side effects of concern. Jessy notes a recent break out two weeks ago.       I spoke with: Jessy JUAN Portillo    The reason for the telephone visit was: acne follow-up    Pertinent history and review of systems:     Jessy is doing really well, no new side effects to the medication. She has continued to experience some dry skin. Reports 3 breakouts this past month, all on the cheeks. Patient is currently in quarantine at home as a result of a close contact having COVID-19, so she is unable to come to clinic for UPT. Will plan on at home pregnancy test. The patient reports tolerable mucocutaneous dryness, and denies arthralgias, myalgias, depression, suicidal ideation, diarrhea, headache, or blurred vision.     Review of Systems:  -Neuro: no HA or vision changes  -GI: No nausea, blood in stool, diarrhea, hx of IBD  -Psych: no depression/anxiety, mood changes, or sleep problems   -Musculoskeletal: no joint or muscle pain or swelling   -Heme/Lymph: no concerning bumps, no bleeding problems  -Constitutional: Otherwise feeling well today, in usual state of health.  -Skin: As above in HPI. No additional skin concerns.     Assessment: Acne Vulgaris and PIH/Acne Scarring    Advice/instructions given to patient/guardian including prescriptions, follow up appointment or orders for diagnostic testin.  Acne scarring/PIH  - Educated on the etiology  - Will start tretinoin 0.025% post-accutane  - Can consider laser treatment if patient  requests post-accutane, discussed with her briefly today     2.  Acne vulgaris - on isotretinoin, end of month #7. Would like her to go 30 days without new breakouts.   - At this visit we will continue isotretinoin 60 mg every day. Goal dose is 120-150mg/kg (8,016-10,020 mg) for this 66.82kg patient.   - Method of contraception includes: IUD and male condoms. Patient is also currently abstinent.   - Sunscreen handout provided. Recommended EltaMD as a facial sunscreen  - Previous labs reviewed and found wnl. No further labs necessary.   -At home pregnancy test will be obtained - patient will send result via [email or web address], is negative can continue with isotretinoin- currently under quarantine due to close contact testing positive for COVID-19  - Patient's iPledge # is [id number].   - Recommended Vanicream, Cetaphil gentle facial cleanser, Vaseline for lips or nose, or gentle moisturizer, etc, to help with skin dryness.   - Total dose: 43929 mg (173.6mg/kg)    Phone call contact time:  Call Started at: 8:09am  Call Ended at: 8:15am    Staff only:    All risks, benefits and alternatives were discussed with patient.  Patient is in agreement and understands the assessment and plan.  All questions were answered.    Italia Shipley PA-C, MPAS  [facility] [place]: Phone: [phone], Fax: [phone]  [facility]: Phone: [phone],  Fax: [phone]

## 2020-04-20 NOTE — NURSING NOTE
Dermatology Rooming Note    Jessy Portillo's goals for this visit include:   Chief Complaint   Patient presents with     Derm Problem     Acne - Accutane. Notes improvement, no side effects of concern. Jessy notes a recent break out two weeks ago.     Jacquie Baugh, CMA

## 2020-07-09 ENCOUNTER — VIRTUAL VISIT (OUTPATIENT)
Dept: DERMATOLOGY | Facility: CLINIC | Age: 20
End: 2020-07-09
Payer: COMMERCIAL

## 2020-07-09 DIAGNOSIS — L90.5 POST-ACNE SCARRING: ICD-10-CM

## 2020-07-09 DIAGNOSIS — L81.0 POST-INFLAMMATORY HYPERPIGMENTATION: Primary | ICD-10-CM

## 2020-07-09 RX ORDER — TRETINOIN 0.25 MG/G
CREAM TOPICAL
Qty: 45 G | Refills: 1 | Status: SHIPPED | OUTPATIENT
Start: 2020-07-09 | End: 2021-04-16

## 2020-07-09 ASSESSMENT — PAIN SCALES - GENERAL: PAINLEVEL: NO PAIN (0)

## 2020-07-09 NOTE — PATIENT INSTRUCTIONS
[facility] Teledermatology Visit    Thank you for allowing us to participate in your care. Your findings, instructions and follow-up plan are as follows:    1.  Acne scarring/PIH  - Ok to start tretinoin 0.025% nightly  - Referral placed for cosmetic derm - would like to discuss options such as CO2, fractionated laser and microneedling    2. Acne Vulgaris -post accutane  -Start tretinoin 0.025% nightly  -Continue with daily sunscreen, at least SPF 30+  -Continue cleanser BID    When should I call my doctor?    If you are worsening or not improving, please, contact us or seek urgent care as noted below.     Who should I call with questions (adults)?    [facility] (adult and pediatric): [phone]     [facility] (adult): [phone]    For urgent needs outside of business hours call the [facility] at [phone] and ask for the dermatology resident on call    If this is a medical emergency and you are unable to reach an ER, Call [phone]      Who should I call with questions (pediatric)?  [facility]- Pediatric Dermatology  Dr. Lisa Asif, Dr. Mic Arias, Dr. Pilar Hurtado, Luiza Martin, PA  Dr. Kiesha Whaley, Dr. Lula Bautista & Dr. Mark Mendoza  Non Urgent  Nurse Triage Line; [phone]- Lida and Radha PAREDES Care Coordinators   Marleni (/Complex ) [phone]    If you need a prescription refill, please contact your pharmacy. Refills are approved or denied by our Physicians during normal business hours, Monday through Fridays  Per office policy, refills will not be granted if you have not been seen within the past year (or sooner depending on your child's condition)    Scheduling Information:  Pediatric Appointment Scheduling and Call Center [phone]  Radiology Scheduling- [phone]  Sedation Unit Scheduling- [phone]  [place]erry Scheduling-  General [phone]; Pediatric Dermatology [phone]  Main  Services: [phone]  Latvian: [phone]  Montserratian: [phone]  Hmong/Celestino/Armenian: [phone]  Preadmission Nursing Department Fax Number: [phone] (Fax all pre-operative paperwork to this number)    For urgent matters arising during evenings, weekends, or holidays that cannot wait for normal business hours please call [phone] and ask for the Dermatology Resident On-Call to be paged.

## 2020-07-09 NOTE — NURSING NOTE
"Dermatology Rooming Note    Jessy Portillo's goals for this visit include:   Chief Complaint   Patient presents with     Acne     Jessy is following up on acne- Jessy states \"I'm hardly breaking out\".      Thania Tomas, RMNICOLE     "

## 2020-07-09 NOTE — LETTER
7/9/2020       RE: Jessy Portillo  [address] [facility] [address]     Dear Colleague,    Thank you for referring your patient, Jessy Portillo, to the [facility] DERMATOLOGY at [facility]. Please see a copy of my visit note below.    [facility]  Store and Forward and Video ( Invitation sent by:  Alla and send to e-mail at: [email or web address] )      Impression and Recommendations (Patient Counseled on the Following):  1.  Acne scarring/PIH  - Educated on the etiology  - Ok to start tretinoin 0.025% nightly  - Referral placed for cosmetic derm - would like to discuss options such as CO2, fractionated laser and microneedling    2. Acne Vulgaris s/p isotretinoin (Total cumulative dose: 54535hv, 200.53mg/kg) which she stopped in May, 2020.  -Start tretinoin 0.025% nightly  -Continue with daily sunscreen  -Continue cleanser BID    Follow-up:   Follow-up with dermatology as needed. Earlier for new or changing lesions or rash.   CC Dr. Stafford on close of this encounter.     Staff only:    All risks, benefits and alternatives were discussed with patient.  Patient is in agreement and understands the assessment and plan.  All questions were answered.    Italia Shipley PA-C, MPAS  [facility]: Phone: [phone], Fax: [phone]  [facility]: Phone: [phone],  Fax: [phone]  _____________________________________________________________________________    Dermatology Problem List:  1. Acne Scarring - pending consult with cosmetic derm for laser, tretinoin 0.025% cream at bedtime  2. Acne - resolved, s/p isotretinoin, discontinued May 2020 (Total cumulative dose: 66726nh, 200.53mg/kg)  -previous tx: adapalene 0.3% gel, sulfamethoxazole-trimethoprim 180 mg BID, clindamycin     Encounter Date: Jul 9, 2020    CC:   Chief Complaint   Patient presents  "with     Acne     Jessy is following up on acne- Jessy states \"I'm hardly breaking out\".        History of Present Illness:  I have reviewed the teledermatology information and the nursing intake corresponding to this issue. Jessy Portillo is a 20 year old female who presents via teledermatology for acne follow-up. She completed isotretinoin 3mo ago. She noted her skin is doing really well. Her moisturize is coming back. She notes some pink scars. She does have some texture change. She is noticing some black heads on the nose. She would like to discuss treating her acne scars on the lower face and cheeks. She has no other concerns today and is otherwise well.     ROS: Patient is generally feeling well today   -Constitutional: Otherwise feeling well today, in usual state of health.  -Skin: As above in HPI. No additional skin concerns.    Physical Examination:  General: Well-appearing, appropriately-developed individual.  Skin: Focused examination within the teledermatology photograph(s) including head, neck and face was performed.   -No acneiform papules or comedones  -mild hyperpigmentation on the bilateral cheeks and along jaw line  -shallow rolling scars on the cheeks and chin    Past Medical History:   Patient Active Problem List   Diagnosis     Acute appendicitis     No past medical history on file.  Past Surgical History:   Procedure Laterality Date     LAPAROSCOPIC APPENDECTOMY N/A 12/9/2016    Procedure: LAPAROSCOPIC APPENDECTOMY;  Surgeon: Perry Mckeon MD;  Location:  OR       Social History:  Patient reports that she has never smoked. She has never used smokeless tobacco.    Family History:  No family history on file.    Medications:  Current Outpatient Medications   Medication     ISOtretinoin (ABSORICA) 30 MG capsule     No current facility-administered medications for this visit.           No Known " Allergies      _____________________________________________________________________________    Teledermatology information:  - Location of patient: [place]  - Patient presented as: return  - Location of teledermatologist:  [facility] DERMATOLOGY )  - Reason teledermatology is appropriate:  of National Emergency Regarding Coronavirus disease (COVID 19) Outbreak  - Image quality and interpretability: acceptable  - Physician has received verbal consent for a Video/Photos Visit from the patient? No  - In-person dermatology visit recommendation: no  - Date of images: 7/9/20  - Length of call: 15min  - Date of report: 7/9/2020     Again, thank you for allowing me to participate in the care of your patient.      Sincerely,    Italia Shipley PA-C

## 2020-11-16 ENCOUNTER — HEALTH MAINTENANCE LETTER (OUTPATIENT)
Age: 20
End: 2020-11-16

## 2021-01-08 VITALS — HEIGHT: 68 IN | BODY MASS INDEX: 22.28 KG/M2 | WEIGHT: 147 LBS

## 2021-01-08 RX ORDER — MULTIPLE VITAMINS W/ MINERALS TAB 9MG-400MCG
1 TAB ORAL DAILY
COMMUNITY

## 2021-01-08 ASSESSMENT — MIFFLIN-ST. JEOR: SCORE: 1480.29

## 2021-01-08 NOTE — PROGRESS NOTES
Jessy is a 20 year old who is being evaluated via a billable video visit.      How would you like to obtain your AVS? MyChart  If the video visit is dropped, the invitation should be resent by: Text to cell phone: [phone]  Will anyone else be joining your video visit? No      Video Start Time: 9:38AM    [facility]   Outpatient Sleep Medicine Consultation  January 11, 2021      Name: Jessy Portillo MRN# [id number]   Age: 20 year old YOB: 2000     Date of Consultation: January 11, 2021  Consultation is requested by: No referring provider defined for this encounter. No ref. provider found  Primary care provider: Tiny Graves         Assessment and Plan:   1. Snoring  2. Excessive daytime sleepiness  3. Memory change  4. Unrefreshed by sleep    Patient is being evaluated for Obstructive Sleep Apnea (HARRIET).  Patient's risk factors/symptoms suggestive of HARRIET include: snoring, excessive daytime sleepiness, un refreshing/unrestful sleep despite adequate sleep time, family history of HARRIET in mother. Suspect she also has somewhat crowded oropharynx given that previous providers have counseled her on monitoring for HARRIET symptoms, but given virtual nature of visit unable to complete exam of my own.     We discussed pathophysiology of HARRIET and testing with overnight attended polysomnography versus home sleep apnea testing. An in lab polysomnography was ordered today given symptoms of but low/intermediate pre-test probability of HARRIET with official STOPBANG score only 2/8. If insurance does not cover in lab study patient understands may have to change order to pursue home sleep apnea test.   - Comprehensive Sleep Study; Future    Briefly discussed potential treatment modalities in the event sleep apnea is identified on testing and additional information given in patient instructions. Will plan to discuss in more detail at next visit pending study results but would be open to CPAP if  "a candidate/if this is recommended. Her mother recently started CPAP and has had significant benefit from use.     Encouraged her to continue keeping consistent sleep schedule and practicing good sleep hygeine. Recommended she avoid napping in late afternoon to better consolidate sleep at night. Discussed avoidance of alcohol before bedtime as this will make snoring worse and any underlying sleep apnea worse.     Follow up 1-2 weeks following her study for review of results and to expedite care. Educational materials provided in instructions.       Chief Complaint / Reason for Sleep Consult:     \" Since I was little, I snored, and I was told to watch out for sleep apnea by my pediatrician.  Growing up, I was always tired, no matter how much he slept leaving me to fall asleep anywhere (often it is in cars). I also realized recently that I have an issue with my memory-I forget events that happened in the past that everyone else around me remembers\"         History of Present Illness:     Jessy Portillo is a 20 year old female who presents to the clinic for evaluation of snoring and daytime tiredness. Patient presents with an essentailly lifelong history of snoring. Has been snoring since early childhood and was told by pediatrician to monitor for signs of sleep apnea as she grows up. She cannot recall if she has ever been told that she has crowded oropharynx or suspicious antaomy but \"maybe\". Snoring is disruptive to others at times. No gasp/choking arousals but has woken herself from sleep due to her snoring before. Denies any witnessed apneas but does not have regular bed partner so somewhat unknown. Does live with roommates but none of them have ever mentioned obvious apneas. Sleeps on sides and stomach. Does not sleep supine, \"I usually wake up less and it's more comfortable on my sides\". Denies nocturia.  Denies nocturnal GERD. Occasional morning headaches, 1-2 times per week that resolves spontaneously " "in 1-1.5 hours. Does report morning dry mouth. Does have morning nasal/sinus congestion but not regularly, states \"I feel like my nose doesn't get enough air sometimes\".     Concerned that poor sleep is causing \"lapses in memory\". Reports that her friends will talk about something that has happened but she has no memory of it. States ever since high school has been \"a tired human\". Used to fall asleep easily and quickly in high school. Sleepiness was at it's worst in high school when she recalls that she had to pull over on side of road while driving due to extreme sleepiness. No accidents or near accidents and denies any current/recent problems with alertness while driving. Patient was counseled on the importance of driving while alert, to pull over if drowsy, or nap before getting into the vehicle if sleepy.    Sleepiness level has improved since she made sleep more of a priority and is keeping more consistent sleep schedule. Now makes sure she gets 7-8 hours a night. Even with 7-8 hours of sleep though she does wake feeling unrefreshed and unrested.  On weekdays, goes to bed at 12:00AM with <10 minute sleep latency and wake time between 7-8:00AM. On weekends, bedtime is 1:00AM wth <10 minute sleep latency and wake time 10:00AM. Denies any difficulty with falling asleep. Wakes 1-2 times per night due to \"being uncomfortable, heat, or stress\". Does read and will use electronics in bed. Takes OTC melaotnin once per week, \"when I really want to fall asleep fast\", does find this helpful.     Naps 3 times per week for ~1 hour, does feel better after napping. Naps primarily on weekdays in late afternoon/early evening between 4-5:00PM. Does not have any extra difficulty falling asleep at night on days she naps.      Patient denies typical restless legs syndrome symptoms. No kicking/leg movements.      Patient denies any abnormal movements or behaviors in their sleep. No dream enactment behavior. Admits to somniloquy.  No " somnambulism.  No sleep related eating. No recurring nightmares or night terrors. Denies bruxism. No sleep paralysis. No waking dreams or hypnopompic/hypnogogic hallucinations.     SCALES       SLEEP APNEA: Stopbang score  2/8       INSOMNIA:  Insomnia severity score: 12/28       SLEEPINESS: [place] sleepiness scale: 13 [normal < 11]          Medications:     Current Outpatient Medications   Medication Sig     calcium gluconate 500 MG tablet Take 500 mg by mouth 2 times daily     multivitamin w/minerals (MULTI-VITAMIN) tablet Take 1 tablet by mouth daily Women's One A Day     tretinoin (RETIN-A) 0.025 % external cream Use every night     UNABLE TO FIND MEDICATION NAME: Collagen     vitamin B-12 (CYANOCOBALAMIN) 100 MCG tablet Take 1,000 mcg by mouth daily     No current facility-administered medications for this visit.              Allergies:     No Known Allergies         Past Medical History:   No past medical history on file.          Past Surgical History:    Previous upper airway surgery: denies    Past Surgical History:   Procedure Laterality Date     LAPAROSCOPIC APPENDECTOMY N/A 12/9/2016    Procedure: LAPAROSCOPIC APPENDECTOMY;  Surgeon: Perry Mckeon MD;  Location:  OR            Social History:     Social History     Tobacco Use     Smoking status: Never Smoker     Smokeless tobacco: Never Used   Substance Use Topics     Alcohol use: Yes     Comment: 4-5 drinks per week      Chemical History:  Alcohol use: Socially, 4-5 drinks per week  Tobacco use: Denies   Illicit substances: Denies   Caffeine intake: Drinks 1-2 cups of coffee per day and occasional tea. Last caffeine intake is usually before 3:00PM         Family History:     No family history on file.    Sleep Family Hx: Patient's mother with HARRIET. Denies any known family history of restless legs syndrome, narcolepsy or other sleep disorders.          Review of Systems:   A complete 10 point review of systems was negative other than HPI or as  "commented below:   Fevers or chills, sore throat, runny nose, muscle pain         Physical Examination:   Ht 1.727 m (5' 8\")   Wt 66.7 kg (147 lb)   BMI 22.35 kg/m    General appearance: Awake, alert, cooperative. Well groomed. Sitting comfortably in chair. In no apparent distress.  HEENT: Head: Normocephalic, atraumatic. Eyes:Conjunctiva clear. Sclera normal. Nose: External appearance without deformity.   Neck: No visible thyroid enlargement.   Cardiovascular: No JVD  Pulmonary:  Able to speak easily in full sentences. No cough or wheeze.   Skin:  No rashes or significant lesions on visible skin.   Neurologic: Alert, oriented x3.   Psychiatric: Mood euthymic. Affect congruent with full range and intensity.            Data: All pertinent previous laboratory data reviewed     No results found for: PH, PHARTERIAL, PO2, [id number], SAT, PCO2, HCO3, BASEEXCESS, LUIGI, BEB  No results found for: TSH  Lab Results   Component Value Date    GLC 91 09/10/2019     (H) 12/08/2016     Lab Results   Component Value Date    HGB 12.2 12/20/2019    HGB 13.0 11/21/2019     Lab Results   Component Value Date    BUN 7 09/10/2019    BUN 8 12/08/2016    CR 0.76 09/10/2019    CR 0.80 12/08/2016     Lab Results   Component Value Date    AST 19 12/20/2019    AST 20 11/21/2019    ALT 17 12/20/2019    ALT 19 11/21/2019    ALKPHOS 63 12/20/2019    ALKPHOS 62 11/21/2019    BILITOTAL 0.3 12/20/2019    BILITOTAL 0.4 11/21/2019     No results found for: UAMP, UBARB, BENZODIAZEUR, UCANN, UCOC, OPIT, UPCP    Copy to: Tiny Graves PA-C  Jan 11, 2021     [facility]  [address] [place] [address]     [facility]  [address] [place] [id number]    Chart documentation was completed, in part, with [employer] voice-recognition software. Even though reviewed, some grammatical, spelling, and word errors may remain.       Video-Visit Details    Type " of service:  Video Visit    Video End Time:10:21AM    Originating Location (pt. Location): Home    Distant Location (provider location):  [facility] SLEEP [facility]     Platform used for Video Visit: [employer]     75 minutes spent on day of encounter doing chart review, history and exam, documentation, and further activities as noted above

## 2021-01-08 NOTE — PATIENT INSTRUCTIONS
"MY TREATMENT INFORMATION FOR SLEEP APNEA-  Jessy Portillo    DOCTOR : Katie Parsons PA-C    Am I having a sleep study at a sleep center?  --->Due to insurance clearance delays, you will be contacted within 2-4 weeks to schedule    Frequently asked questions:  1. What is Obstructive Sleep Apnea (HARRIET)? HARRIET is the most common type of sleep apnea. Apnea means, \"without breath.\"  Apnea is most often caused by narrowing or collapse of the upper airway as muscles relax during sleep.   Almost everyone has occasional apneas. Most people with sleep apnea have had brief interruptions at night frequently for many years.  The severity of sleep apnea is related to how frequent and severe the events are.   2. What are the consequences of HARRIET? Symptoms include: feeling sleepy during the day, snoring loudly, gasping or stopping of breathing, trouble sleeping, and occasionally morning headaches or heartburn at night.  Sleepiness can be serious and even increase the risk of falling asleep while driving. Other health consequences may include development of high blood pressure and other cardiovascular disease in persons who are susceptible. Untreated HARRIET  can contribute to heart disease, stroke and diabetes.   3. What are the treatment options? In most situations, sleep apnea is a lifelong disease that must be managed with daily therapy. Medications are not effective for sleep apnea and surgery is generally not considered until other therapies have been tried. Your treatment is your choice . Continuous Positive Airway (CPAP) works right away and is the therapy that is effective in nearly everyone. An oral device to hold your jaw forward is usually the next most reliable option. Other options include postioning devices (to keep you off your back), weight loss, and surgery including a tongue pacing device. There is more detail about some of these options below.  4. Are my sleep studies covered by insurance? Although we will " request verification of coverage, we advise you also check in advance of the study to ensure there is coverage.    Important tips for those choosing CPAP and similar devices   Know your equipment:  CPAP is continuous positive airway pressure that prevents obstructive sleep apnea by keeping the throat from collapsing while you are sleeping. In most cases, the device is  smart  and can slowly self-adjusts if your throat collapses and keeps a record every day of how well you are treated-this information is available to you and your care team.  BPAP is bilevel positive airway pressure that keeps your throat open and also assists each breath with a pressure boost to maintain adequate breathing.  Special kinds of BPAP are used in patients who have inadequate breathing from lung or heart disease. In most cases, the device is  smart  and can slowly self-adjusts to assist breathing. Like CPAP, the device keeps a record of how well you are treated.  Your mask is your connection to the device. You get to choose what feels most comfortable and the staff will help to make sure if fits. Here: are some examples of the different masks that are available:       Key points to remember on your journey with sleep apnea:  1. Sleep study.  PAP devices often need to be adjusted during a sleep study to show that they are effective and adjusted right.  2. Good tips to remember: Try wearing just the mask during a quiet time during the day so your body adapts to wearing it. A humidifier is recommended for comfort in most cases to prevent drying of your nose and throat. Allergy medication from your provider may help you if you are having nasal congestion.  3. Getting settled-in. It takes more than one night for most of us to get used to wearing a mask. Try wearing just the mask during a quiet time during the day so your body adapts to wearing it. A humidifier is recommended for comfort in most cases. Our team will work with you carefully on  the first day and will be in contact within 4 days and again at 2 and 4 weeks for advice and remote device adjustments. Your therapy is evaluated by the device each day.   4. Use it every night. The more you are able to sleep naturally for 7-8 hours, the more likely you will have good sleep and to prevent health risks or symptoms from sleep apnea. Even if you use it 4 hours it helps. Occasionally all of us are unable to use a medical therapy, in sleep apnea, it is not dangerous to miss one night.   5. Communicate. Call our skilled team on the number provided on the first day if your visit for problems that make it difficult to wear the device. Over 2 out of 3 patients can learn to wear the device long-term with help from our team. Remember to call our team or your sleep providers if you are unable to wear the device as we may have other solutions for those who cannot adapt to mask CPAP therapy. It is recommended that you sleep your sleep provider within the first 3 months and yearly after that if you are not having problems.   6. Use it for your health. We encourage use of CPAP masks during daytime quiet periods to allow your face and brain to adapt to the sensation of CPAP so that it will be a more natural sensation to awaken to at night or during naps. This can be very useful during the first few weeks or months of adapting to CPAP though it does not help medically to wear CPAP during wakefulness and  should not be used as a strategy just to meet guidelines.  7. Take care of your equipment. Make sure you clean your mask and tubing using directions every day and that your filter and mask are replaced as recommended or if they are not working.     BESIDES CPAP, WHAT OTHER THERAPIES ARE THERE?    Positioning Device  Positioning devices are generally used when sleep apnea is mild and only occurs on your back.This example shows a pillow that straps around the waist. It may be appropriate for those whose sleep study  shows milder sleep apnea that occurs primarily when lying flat on one's back. Preliminary studies have shown benefit but effectiveness at home may need to be verified by a home sleep test. These devices are generally not covered by medical insurance.  Examples of devices that maintain sleeping on the back to prevent snoring and mild sleep apnea.    Belt type body positioner  http://[employer]/    Electronic reminder  http://nightshifttherapy.com/  http://www.[employer].Opsware.au/      Oral Appliance  What is oral appliance therapy?  An oral appliance device fits on your teeth at night like a retainer used after having braces. The device is made by a specialized dentist and requires several visits over 1-2 months before a manufactured device is made to fit your teeth and is adjusted to prevent your sleep apnea. Once an oral device is working properly, snoring should be improved. A home sleep test may be recommended at that time if to determine whether the sleep apnea is adequately treated.       Some things to remember:  -Oral devices are often, but not always, covered by your medical insurance. Be sure to check with your insurance provider.   -If you are referred for oral therapy, you will be given a list of specialized dentists to consider or you may choose to visit the Web site of the American Academy of Dental Sleep Medicine  -Oral devices are less likely to work if you have severe sleep apnea or are extremely overweight.     More detailed information  An oral appliance is a small acrylic device that fits over the upper and lower teeth  (similar to a retainer or a mouth guard). This device slightly moves jaw forward, which moves the base of the tongue forward, opens the airway, improves breathing for effective treat snoring and obstructive sleep apnea in perhaps 7 out of 10 people .  The best working devices are custom-made by a dental device  after a mold is made of the teeth 1, 2, 3.  When is an oral  appliance indicated?  Oral appliance therapy is recommended as a first-line treatment for patients with primary snoring, mild sleep apnea, and for patients with moderate sleep apnea who prefer appliance therapy to use of CPAP4, 5. Severity of sleep apnea is determined by sleep testing and is based on the number of respiratory events per hour of sleep.   How successful is oral appliance therapy?  The success rate of oral appliance therapy in patients with mild sleep apnea is 75-80% while in patients with moderate sleep apnea it is 50-70%. The chance of success in patients with severe sleep apnea is 40-50%. The research also shows that oral appliances have a beneficial effect on the cardiovascular health of HARRIET patients at the same magnitude as CPAP therapy7.  Oral appliances should be a second-line treatment in cases of severe sleep apnea, but if not completely successful then a combination therapy utilizing CPAP plus oral appliance therapy may be effective. Oral appliances tend to be effective in a broad range of patients although studies show that the patients who have the highest success are females, younger patients, those with milder disease, and less severe obesity. 3, 6.   Finding a dentist that practices dental sleep medicine  Specific training is available through the American Academy of Dental Sleep Medicine for dentists interested in working in the field of sleep. To find a dentist who is educated in the field of sleep and the use of oral appliances, near you, visit the Web site of the American Academy of Dental Sleep Medicine.    References  1. Conor et al. Objectively measured vs self-reported compliance during oral appliance therapy for sleep-disordered breathing. Chest 2013; 144(5): 0143-0056.  2. Chago et al. Objective measurement of compliance during oral appliance therapy for sleep-disordered breathing. Thorax 2013; 68(1): 91-96.  3. Natasha et al. Mandibular advancement devices in  620 men and women with HARRIET and snoring: tolerability and predictors of treatment success. Chest 2004; 125: 1259-9617.  4. Kavya et al. Oral appliances for snoring and HARRIET: a review. Sleep 2006; 29: 244-262.  5. Ne et al. Oral appliance treatment for HARRIET: an update. J Clin Sleep Med 2014; 10(2): 215-227.  6. Rivera et al. Predictors of OSAH treatment outcome. J Dent Res 2007; 86: 7964-1157.      Weight Loss:    Weight loss is a long-term strategy that may improve sleep apnea in some patients.    Weight management is a personal decision and the decision should be based on your interest and the potential benefits.  If you are interested in exploring weight loss strategies, the following discussion covers the impact on weight loss on sleep apnea and the approaches that may be successful.    Being overweight does not necessarily mean you will have health consequences.  Those who have BMI over 35 or over 27 with existing medical conditions carries greater risk.   Weight loss decreases severity of sleep apnea in most people with obesity. For those with mild obesity who have developed snoring with weight gain, even 15-30 pound weight loss can improve and occasionally eliminate sleep apnea.  Structured and life-long dietary and health habits are necessary to lose weight and keep healthier weight levels.     Though there may be significant health benefits from weight loss, long-term weight loss is very difficult to achieve- studies show success with dietary management in less than 10% of people. In addition, substantial weight loss may require years of dietary control and may be difficult if patients have severe obesity. In these cases, surgical management may be considered.  Finally, older individuals who have tolerated obesity without health complications may be less likely to benefit from weight loss strategies.        Your BMI is Body mass index is 22.35 kg/m .  Weight management is a personal decision.  If  you are interested in exploring weight loss strategies, the following discussion covers the approaches that may be successful. Body mass index (BMI) is one way to tell whether you are at a healthy weight, overweight, or obese. It measures your weight in relation to your height.  A BMI of 18.5 to 24.9 is in the healthy range. A person with a BMI of 25 to 29.9 is considered overweight, and someone with a BMI of 30 or greater is considered obese. More than two-thirds of American adults are considered overweight or obese.  Being overweight or obese increases the risk for further weight gain. Excess weight may lead to heart disease and diabetes.  Creating and following plans for healthy eating and physical activity may help you improve your health.  Weight control is part of healthy lifestyle and includes exercise, emotional health, and healthy eating habits. Careful eating habits lifelong are the mainstay of weight control. Though there are significant health benefits from weight loss, long-term weight loss with diet alone may be very difficult to achieve- studies show long-term success with dietary management in less than 10% of people. Attaining a healthy weight may be especially difficult to achieve in those with severe obesity. In some cases, medications, devices and surgical management might be considered.  What can you do?  If you are overweight or obese and are interested in methods for weight loss, you should discuss this with your provider.     Consider reducing daily calorie intake by 500 calories.     Keep a food journal.     Avoiding skipping meals, consider cutting portions instead.    Diet combined with exercise helps maintain muscle while optimizing fat loss. Strength training is particularly important for building and maintaining muscle mass. Exercise helps reduce stress, increase energy, and improves fitness. Increasing exercise without diet control, however, may not burn enough calories to loose  weight.       Start walking three days a week 10-20 minutes at a time    Work towards walking thirty minutes five days a week     Eventually, increase the speed of your walking for 1-2 minutes at time    In addition, we recommend that you review healthy lifestyles and methods for weight loss available through the National Institutes of Health patient information sites:  http://win.niddk.nih.gov/publications/index.htm    And look into health and wellness programs that may be available through your health insurance provider, employer, local community center, or joel club.      Surgery:    Surgery for obstructive sleep apnea is considered generally only when other therapies fail to work. Surgery may be discussed with you if you are having a difficult time tolerating CPAP and or when there is an abnormal structure that requires surgical correction.  Nose and throat surgeries often enlarge the airway to prevent collapse.  Most of these surgeries create pain for 1-2 weeks and up to half of the most common surgeries are not effective throughout life.  You should carefully discuss the benefits and drawbacks to surgery with your sleep provider and surgeon to determine if it is the best solution for you.   More information  Surgery for HARRIET is directed at areas that are responsible for narrowing or complete obstruction of the airway during sleep.  There are a wide range of procedures available to enlarge and/or stabilize the airway to prevent blockage of breathing in the three major areas where it can occur: the palate, tongue, and nasal regions.  Successful surgical treatment depends on the accurate identification of the factors responsible for obstructive sleep apnea in each person.  A personalized approach is required because there is no single treatment that works well for everyone.  Because of anatomic variation, consultation with an examination by a sleep surgeon is a critical first step in determining what surgical  options are best for each patient.  In some cases, examination during sedation may be recommended in order to guide the selection of procedures.  Patients will be counseled about risks and benefits as well as the typical recovery course after surgery. Surgery is typically not a cure for a person s HARRIET.  However, surgery will often significantly improve one s HARRIET severity (termed  success rate ).  Even in the absence of a cure, surgery will decrease the cardiovascular risk associated with OSA7; improve overall quality of life8 (sleepiness, functionality, sleep quality, etc).      Palate Procedures:  Patients with HARRIET often have narrowing of their airway in the region of their tonsils and uvula.  The goals of palate procedures are to widen the airway in this region as well as to help the tissues resist collapse.  Modern palate procedure techniques focus on tissue conservation and soft tissue rearrangement, rather than tissue removal.  Often the uvula is preserved in this procedure. Residual sleep apnea is common in patient after pharyngoplasty with an average reduction in sleep apnea events of 33%2.      Tongue Procedures:  ExamWhile patients are awake, the muscles that surround the throat are active and keep this region open for breathing. These muscles relax during sleep, allowing the tongue and other structures to collapse and block breathing.  There are several different tongue procedures available.  Selection of a tongue base procedure depends on characteristics seen on physical exam.  Generally, procedures are aimed at removing bulky tissues in this area or preventing the back of the tongue from falling back during sleep.  Success rates for tongue surgery range from 50-62%3.    Hypoglossal Nerve Stimulation:  Hypoglossal nerve stimulation has recently received approval from the [place] Food and Drug Administration for the treatment of obstructive sleep apnea.  This is based on research showing that the  system was safe and effective in treating sleep apnea6.  Results showed that the median AHI score decreased 68%, from 29.3 to 9.0. This therapy uses an implant system that senses breathing patterns and delivers mild stimulation to airway muscles, which keeps the airway open during sleep.  The system consists of three fully implanted components: a small generator (similar in size to a pacemaker), a breathing sensor, and a stimulation lead.  Using a small handheld remote, a patient turns the therapy on before bed and off upon awakening.    Candidates for this device must be greater than 22 years of age, have moderate to severe HARRIET (AHI between 20-65), BMI less than 32, have tried CPAP/oral appliance without success, and have appropriate upper airway anatomy (determined by a sleep endoscopy performed by Dr. Enriquez).    Hypoglossal Nerve Stimulation Pathway:    The sleep surgeon s office will work with the patient through the insurance prior-authorization process (including communications and appeals).    Nasal Procedures:  Nasal obstruction can interfere with nasal breathing during the day and night.  Studies have shown that relief of nasal obstruction can improve the ability of some patients to tolerate positive airway pressure therapy for obstructive sleep apnea1.  Treatment options include medications such as nasal saline, topical corticosteroid and antihistamine sprays, and oral medications such as antihistamines or decongestants. Non-surgical treatments can include external nasal dilators for selected patients. If these are not successful by themselves, surgery can improve the nasal airway either alone or in combination with these other options.      Combination Procedures:  Combination of surgical procedures and other treatments may be recommended, particularly if patients have more than one area of narrowing or persistent positional disease.  The success rate of combination surgery ranges from 66-80%2,3.     References  1. Osorio VELEZ. The Role of the Nose in Snoring and Obstructive Sleep Apnoea: An Update.  Eur Arch Otorhinolaryngol. 2011; 268: 1365-73.  2.  Gurpreet SM; Constantine JA; Karrie JR; Pallanch JF; Lillian MB; Perico SG; Brooks CARDENAS. Surgical modifications of the upper airway for obstructive sleep apnea in adults: a systematic review and meta-analysis. SLEEP 2010;33(10):6162-4189. Christiano RICK. Hypopharyngeal surgery in obstructive sleep apnea: an evidence-based medicine review.  Arch Otolaryngol Head Neck Surg. 2006 Feb;132(2):206-13.  3. Emerson YH1, Curtis Y, Kale ROHIT. The efficacy of anatomically based multilevel surgery for obstructive sleep apnea. Otolaryngol Head Neck Surg. 2003 Oct;129(4):327-35.  4. Christiano RICK, Goldberg A. Hypopharyngeal Surgery in Obstructive Sleep Apnea: An Evidence-Based Medicine Review. Arch Otolaryngol Head Neck Surg. 2006 Feb;132(2):206-13.  5. Patricia PJ et al. Upper-Airway Stimulation for Obstructive Sleep Apnea.  N Engl J Med. 2014 Jan 9;370(2):139-49.  6. Rosalio Y et al. Increased Incidence of Cardiovascular Disease in Middle-aged Men with Obstructive Sleep Apnea. Am J Respir Crit Care Med; 2002 166: 159-165  7. Marta EM et al. Studying Life Effects and Effectiveness of Palatopharyngoplasty (SLEEP) study: Subjective Outcomes of Isolated Uvulopalatopharyngoplasty. Otolaryngol Head Neck Surg. 2011; 144: 623-631.

## 2021-01-11 ENCOUNTER — VIRTUAL VISIT (OUTPATIENT)
Dept: SLEEP MEDICINE | Facility: CLINIC | Age: 21
End: 2021-01-11
Payer: COMMERCIAL

## 2021-01-11 DIAGNOSIS — R06.83 SNORING: Primary | ICD-10-CM

## 2021-01-11 DIAGNOSIS — R41.3 MEMORY CHANGE: ICD-10-CM

## 2021-01-11 DIAGNOSIS — G47.8 UNREFRESHED BY SLEEP: ICD-10-CM

## 2021-01-11 DIAGNOSIS — G47.19 EXCESSIVE DAYTIME SLEEPINESS: ICD-10-CM

## 2021-01-11 PROCEDURE — 99205 OFFICE O/P NEW HI 60 MIN: CPT | Mod: 95 | Performed by: PHYSICIAN ASSISTANT

## 2021-04-03 ENCOUNTER — HEALTH MAINTENANCE LETTER (OUTPATIENT)
Age: 21
End: 2021-04-03

## 2021-04-16 ENCOUNTER — OFFICE VISIT (OUTPATIENT)
Dept: DERMATOLOGY | Facility: CLINIC | Age: 21
End: 2021-04-16
Payer: COMMERCIAL

## 2021-04-16 DIAGNOSIS — L90.5 POST-ACNE SCARRING: ICD-10-CM

## 2021-04-16 DIAGNOSIS — L81.0 POST-INFLAMMATORY HYPERPIGMENTATION: ICD-10-CM

## 2021-04-16 DIAGNOSIS — L70.0 ACNE VULGARIS: Primary | ICD-10-CM

## 2021-04-16 PROCEDURE — 99213 OFFICE O/P EST LOW 20 MIN: CPT | Performed by: PHYSICIAN ASSISTANT

## 2021-04-16 RX ORDER — DAPSONE 75 MG/G
GEL TOPICAL DAILY
Qty: 180 G | Refills: 1 | Status: SHIPPED | OUTPATIENT
Start: 2021-04-16

## 2021-04-16 RX ORDER — TRETINOIN 0.25 MG/G
CREAM TOPICAL
Qty: 90 G | Refills: 1 | Status: SHIPPED | OUTPATIENT
Start: 2021-04-16

## 2021-04-16 ASSESSMENT — PAIN SCALES - GENERAL: PAINLEVEL: NO PAIN (0)

## 2021-04-16 NOTE — LETTER
4/16/2021       RE: Jessy Portillo  [address] [facility] [address]     Dear Colleague,    Thank you for referring your patient, Jessy Portillo, to the [facility] [place] at [facility]. Please see a copy of my visit note below.    [facility] Dermatology Note  Encounter Date: Apr 16, 2021  Office Visit      Dermatology Problem List:  1. Acne Scarring - aczone 7.5% gel QAM, SA cleanser, tretinoin 0.025% cream at bedtime  2. Acne - s/p isotretinoin, discontinued May 2020 (Total cumulative dose: [id number], 200.53mg/kg)  -previous tx: adapalene 0.3% gel, sulfamethoxazole-trimethoprim 180 mg BID, clindamycin     Social: Father losing medical insurance in 2mo  ____________________________________________    Assessment & Plan:  # Acne vulgaris/PIH.   -continue tretinoin 0.025% cream at bedtime - increase use gradually to daily  -continue SA cleanser BID  -start dapsone 7.5% gel QAM  -continue with oil free sun protection - SPF 30+       Procedures Performed:   none    Follow-up: prn for new or changing lesions    Staff:     All risks, benefits and alternatives were discussed with patient.  Patient is in agreement and understands the assessment and plan.  All questions were answered.    Italia Shipley PA-C, MPAS  [facility] [place]: Phone: [phone], Fax: [phone]  [facility]: Phone: [phone],  Fax: [phone]  ____________________________________________    CC: Acne (pt states she is here for a follow up on her skin )      HPI:  Ms. Jessy Portillo is a 21 year old female who presents today as a return patient for acne follow up. Is using tretinoin 3-4x weekly. Cleansing with an SA based cleanser. Using moisturizer. Uses sunscreen daily.     Patient is otherwise feeling well, without additional  concerns.    Labs:  none    Physical Exam:  Vitals: There were no vitals taken for this visit.  SKIN: Acne exam, which includes the face, neck, upper central chest, and upper central back was performed.   - few comedones on the forehead, few very small papules scattered on cheeks, overall much improved from baseline  - No other lesions of concern on areas examined.     Medications:  Current Outpatient Medications   Medication     tretinoin (RETIN-A) 0.025 % external cream     calcium gluconate 500 MG tablet     MELATONIN PO     multivitamin w/minerals (MULTI-VITAMIN) tablet     UNABLE TO FIND     vitamin B-12 (CYANOCOBALAMIN) 100 MCG tablet     No current facility-administered medications for this visit.       Past Medical/Surgical History:   Patient Active Problem List   Diagnosis     Acute appendicitis     No past medical history on file.    CC Dr. Stafford on close of this encounter.

## 2021-04-16 NOTE — PROGRESS NOTES
[facility] Dermatology Note  Encounter Date: Apr 16, 2021  Office Visit      Dermatology Problem List:  1. Acne Scarring - aczone 7.5% gel QAM, SA cleanser, tretinoin 0.025% cream at bedtime  2. Acne - s/p isotretinoin, discontinued May 2020 (Total cumulative dose: 71815kx, 200.53mg/kg)  -previous tx: adapalene 0.3% gel, sulfamethoxazole-trimethoprim 180 mg BID, clindamycin     Social: Father losing medical insurance in 2mo  ____________________________________________    Assessment & Plan:  # Acne vulgaris/PIH.   -continue tretinoin 0.025% cream at bedtime - increase use gradually to daily  -continue SA cleanser BID  -start dapsone 7.5% gel QAM  -continue with oil free sun protection - SPF 30+       Procedures Performed:   none    Follow-up: prn for new or changing lesions    Staff:     All risks, benefits and alternatives were discussed with patient.  Patient is in agreement and understands the assessment and plan.  All questions were answered.    Italia Shipley PA-C, MPAS  [facility] [place]: Phone: [phone], Fax: [phone]  [facility]: Phone: [phone],  Fax: [phone]  ____________________________________________    CC: Acne (pt states she is here for a follow up on her skin )      HPI:  Ms. Jessy Portillo is a 21 year old female who presents today as a return patient for acne follow up. Is using tretinoin 3-4x weekly. Cleansing with an SA based cleanser. Using moisturizer. Uses sunscreen daily.     Patient is otherwise feeling well, without additional concerns.    Labs:  none    Physical Exam:  Vitals: There were no vitals taken for this visit.  SKIN: Acne exam, which includes the face, neck, upper central chest, and upper central back was performed.   - few comedones on the forehead, few very small papules scattered on cheeks, overall much improved from baseline  - No other lesions of  concern on areas examined.     Medications:  Current Outpatient Medications   Medication     tretinoin (RETIN-A) 0.025 % external cream     calcium gluconate 500 MG tablet     MELATONIN PO     multivitamin w/minerals (MULTI-VITAMIN) tablet     UNABLE TO FIND     vitamin B-12 (CYANOCOBALAMIN) 100 MCG tablet     No current facility-administered medications for this visit.       Past Medical/Surgical History:   Patient Active Problem List   Diagnosis     Acute appendicitis     No past medical history on file.    CC Dr. Stafford on close of this encounter.

## 2021-09-18 ENCOUNTER — HEALTH MAINTENANCE LETTER (OUTPATIENT)
Age: 21
End: 2021-09-18

## 2022-04-30 ENCOUNTER — HEALTH MAINTENANCE LETTER (OUTPATIENT)
Age: 22
End: 2022-04-30

## 2022-11-19 ENCOUNTER — HEALTH MAINTENANCE LETTER (OUTPATIENT)
Age: 22
End: 2022-11-19

## 2023-06-01 ENCOUNTER — HEALTH MAINTENANCE LETTER (OUTPATIENT)
Age: 23
End: 2023-06-01

## 2024-06-16 ENCOUNTER — HEALTH MAINTENANCE LETTER (OUTPATIENT)
Age: 24
End: 2024-06-16